# Patient Record
Sex: FEMALE | Race: WHITE | Employment: OTHER | URBAN - METROPOLITAN AREA
[De-identification: names, ages, dates, MRNs, and addresses within clinical notes are randomized per-mention and may not be internally consistent; named-entity substitution may affect disease eponyms.]

---

## 2018-08-16 ENCOUNTER — HOSPITAL ENCOUNTER (EMERGENCY)
Age: 84
Discharge: HOME | End: 2018-08-16
Attending: EMERGENCY MEDICINE
Payer: MEDICARE

## 2018-08-16 VITALS
OXYGEN SATURATION: 96 % | HEIGHT: 59 IN | TEMPERATURE: 97.8 F | RESPIRATION RATE: 18 BRPM | WEIGHT: 150 LBS | HEART RATE: 73 BPM | SYSTOLIC BLOOD PRESSURE: 134 MMHG | DIASTOLIC BLOOD PRESSURE: 79 MMHG | BODY MASS INDEX: 30.24 KG/M2

## 2018-08-16 DIAGNOSIS — T63.441A ALLERGIC REACTION TO BEE STING: Primary | ICD-10-CM

## 2018-08-16 PROCEDURE — 99282 EMERGENCY DEPT VISIT SF MDM: CPT

## 2018-08-16 ASSESSMENT — ENCOUNTER SYMPTOMS
SHORTNESS OF BREATH: 1
COUGH: 0
WHEEZING: 0
CONSTIPATION: 0
SORE THROAT: 0
VOMITING: 0
SINUS PAIN: 0
NAUSEA: 0
TROUBLE SWALLOWING: 1
PHOTOPHOBIA: 0
SINUS PRESSURE: 0
RHINORRHEA: 0
ABDOMINAL PAIN: 0
DIARRHEA: 0

## 2018-08-16 ASSESSMENT — PAIN DESCRIPTION - PAIN TYPE: TYPE: ACUTE PAIN

## 2018-08-16 ASSESSMENT — PAIN SCALES - GENERAL: PAINLEVEL_OUTOF10: 5

## 2018-08-16 ASSESSMENT — PAIN DESCRIPTION - LOCATION: LOCATION: HAND

## 2018-08-16 ASSESSMENT — PAIN DESCRIPTION - ORIENTATION: ORIENTATION: LEFT

## 2018-08-16 NOTE — ED PROVIDER NOTES
confusion. Physical Exam   Constitutional: She is oriented to person, place, and time. She appears well-developed and well-nourished. No distress. HENT:   Head: Normocephalic and atraumatic. Mouth/Throat: No oropharyngeal exudate. Upper lip swelling noted. No posterior oropharyngeal erythema or edema. Eyes: Pupils are equal, round, and reactive to light. Conjunctivae are normal. Right eye exhibits no discharge. Left eye exhibits no discharge. No scleral icterus. Neck: Normal range of motion. Neck supple. Cardiovascular: Normal rate, regular rhythm and normal heart sounds. Exam reveals no gallop. No murmur heard. Pulmonary/Chest: Effort normal and breath sounds normal. No respiratory distress. She has no decreased breath sounds. She has no wheezes. Clear breath sounds in all lung fields. No wheezes, or increased work of breathing. Pulse ox 97% on room air. No stridor present   Abdominal: Soft. Bowel sounds are normal. She exhibits no distension. There is no tenderness. Musculoskeletal: Normal range of motion. She exhibits no edema or tenderness. 3 areas of erythema on the left dorsal aspect of the hand. No stinger present. Lymphadenopathy:     She has no cervical adenopathy. Neurological: She is alert and oriented to person, place, and time. No cranial nerve deficit. Coordination normal.   Skin: Skin is warm and dry. No rash noted. She is not diaphoretic. No erythema. No urticaria present. Psychiatric: She has a normal mood and affect. Her behavior is normal.       Procedures    [facility]         --------------------------------------------- PAST HISTORY ---------------------------------------------  Past Medical History:  has a past medical history of Hypertension. Past Surgical History:  has a past surgical history that includes Hysterectomy; Eye surgery; Nerve Block (Bilateral); and Nerve Block (Bilateral, 08/30/2016). Social History:  reports that she has never smoked.  She has never used smokeless tobacco. She reports that she does not drink alcohol or use drugs. Family History: family history is not on file. The patients home medications have been reviewed. Allergies: Patient has no known allergies. -------------------------------------------------- RESULTS -------------------------------------------------  Labs:  No results found for this visit on 08/16/18. Radiology:  No orders to display       ------------------------- NURSING NOTES AND VITALS REVIEWED ---------------------------  Date / Time Roomed:  8/16/2018  4:05 PM  ED Bed Assignment:  07/07    The nursing notes within the ED encounter and vital signs as below have been reviewed. /73   Pulse 66   Temp 98 °F (36.7 °C) (Oral)   Resp 18   Ht 4' 11\" (1.499 m)   Wt 150 lb (68 kg)   SpO2 96%   BMI 30.30 kg/m²   Oxygen Saturation Interpretation: Normal      ------------------------------------------ PROGRESS NOTES ------------------------------------------  Time: 17:47. Patient sleeping, no acute distress. Time: 19:01. Patient sleeping, woke her up. She is in no acute distress. She feels better. The patient will be discharged home. I have spoken with the patient and discussed todays results, in addition to providing specific details for the plan of care and counseling regarding the diagnosis and prognosis. Their questions are answered at this time and they are agreeable with the plan. I discussed at length with them reasons for immediate return here for re evaluation. They will followup with primary care by calling their office tomorrow. --------------------------------- ADDITIONAL PROVIDER NOTES ---------------------------------  At this time the patient is without objective evidence of an acute process requiring hospitalization or inpatient management. They have remained hemodynamically stable throughout their entire ED visit and are stable for discharge with outpatient follow-up. The plan has been discussed in detail and they are aware of the specific conditions for emergent return, as well as the importance of follow-up. New Prescriptions    No medications on file       Diagnosis:  1. Allergic reaction to bee sting        Disposition:  Patient's disposition: Discharge to home  Patient's condition is stable. ATTENDING PROVIDER ATTESTATION:     I have personally performed and/or participated in the history, exam, medical decision making, and procedures and agree with all pertinent clinical information unless otherwise noted. I have also reviewed and agree with the past medical, family and social history unless otherwise noted. I have discussed this patient in detail with the resident and provided the instruction and education regarding the evidence-based evaluation and treatment of Allergic Reaction (Patient was stung around 15:15. Patient brought in per EMS for allergic reaction which has never happened before acording to th patient. 0.5 of EPI was given, 50mg of Benadryl, 125mg Solumedrol and Duoneb treatment given prior to arrival.)    History: Patient is a 71-year-old female presenting to the emergency department the chief complaint of allergic reaction. The patient states that presently an hour prior to arrival she sustained 3 bee stings in the left hand. The patient felt lightheaded. The patient did also feel she is having difficulty breathing and that her throat was closing up and was difficult to swallow. EMS was called when they arrived the patient's pulse ox was 79% on room air. The patient does not wear oxygen. Patient did receive 1 DuoNeb, 0.2 mg of epinephrine, 50 mg of Benadryl and 125 mg of Solu-Medrol. The patient states she did have significant improvement in her symptoms. The patient denies any symptoms at the current time.  The patient never had a similar reaction in the past.    My findings: Rios Jones is a 81 y.o. female   Constitutional/General:

## 2018-08-16 NOTE — ED NOTES
Bed: 07  Expected date:   Expected time:   Means of arrival:   Comments:  Jody Thomas RN  08/16/18 8212

## 2024-11-12 ENCOUNTER — HOSPITAL ENCOUNTER (OUTPATIENT)
Age: 89
Discharge: HOME | End: 2024-11-12
Payer: MEDICARE

## 2024-11-12 LAB
ALBUMIN SERPL-MCNC: 3.9 G/DL (ref 3.5–5.2)
ALP SERPL-CCNC: 48 U/L (ref 35–104)
ALT SERPL-CCNC: 12 U/L (ref 0–32)
ANION GAP SERPL CALCULATED.3IONS-SCNC: 9 MMOL/L (ref 7–16)
AST SERPL-CCNC: 17 U/L (ref 0–31)
BASOPHILS # BLD: 0.07 K/UL (ref 0–0.2)
BASOPHILS NFR BLD: 1 % (ref 0–2)
BILIRUB SERPL-MCNC: 0.4 MG/DL (ref 0–1.2)
BUN SERPL-MCNC: 15 MG/DL (ref 6–23)
CALCIUM SERPL-MCNC: 11.1 MG/DL (ref 8.6–10.2)
CHLORIDE SERPL-SCNC: 96 MMOL/L (ref 98–107)
CO2 SERPL-SCNC: 29 MMOL/L (ref 22–29)
CREAT SERPL-MCNC: 0.7 MG/DL (ref 0.5–1)
EOSINOPHIL # BLD: 0.57 K/UL (ref 0.05–0.5)
EOSINOPHILS RELATIVE PERCENT: 8 % (ref 0–6)
ERYTHROCYTE [DISTWIDTH] IN BLOOD BY AUTOMATED COUNT: 12.6 % (ref 11.5–15)
GFR, ESTIMATED: 76 ML/MIN/1.73M2
GLUCOSE SERPL-MCNC: 115 MG/DL (ref 74–99)
HBA1C MFR BLD: 5.5 % (ref 4–5.6)
HCT VFR BLD AUTO: 38.4 % (ref 34–48)
HGB BLD-MCNC: 13.3 G/DL (ref 11.5–15.5)
IMM GRANULOCYTES # BLD AUTO: 0.03 K/UL (ref 0–0.58)
IMM GRANULOCYTES NFR BLD: 0 % (ref 0–5)
LYMPHOCYTES NFR BLD: 2.34 K/UL (ref 1.5–4)
LYMPHOCYTES RELATIVE PERCENT: 31 % (ref 20–42)
MCH RBC QN AUTO: 32.8 PG (ref 26–35)
MCHC RBC AUTO-ENTMCNC: 34.6 G/DL (ref 32–34.5)
MCV RBC AUTO: 94.8 FL (ref 80–99.9)
MONOCYTES NFR BLD: 0.89 K/UL (ref 0.1–0.95)
MONOCYTES NFR BLD: 12 % (ref 2–12)
NEUTROPHILS NFR BLD: 48 % (ref 43–80)
NEUTS SEG NFR BLD: 3.6 K/UL (ref 1.8–7.3)
PLATELET # BLD AUTO: 281 K/UL (ref 130–450)
PMV BLD AUTO: 8.8 FL (ref 7–12)
POTASSIUM SERPL-SCNC: 4.3 MMOL/L (ref 3.5–5)
PROT SERPL-MCNC: 6.5 G/DL (ref 6.4–8.3)
RBC # BLD AUTO: 4.05 M/UL (ref 3.5–5.5)
SODIUM SERPL-SCNC: 134 MMOL/L (ref 132–146)
TSH SERPL DL<=0.05 MIU/L-ACNC: 2.21 UIU/ML (ref 0.27–4.2)
WBC OTHER # BLD: 7.5 K/UL (ref 4.5–11.5)

## 2024-11-12 PROCEDURE — 84443 ASSAY THYROID STIM HORMONE: CPT

## 2024-11-12 PROCEDURE — 85025 COMPLETE CBC W/AUTO DIFF WBC: CPT

## 2024-11-12 PROCEDURE — 80053 COMPREHEN METABOLIC PANEL: CPT

## 2024-11-12 PROCEDURE — 36415 COLL VENOUS BLD VENIPUNCTURE: CPT

## 2024-11-12 PROCEDURE — 83036 HEMOGLOBIN GLYCOSYLATED A1C: CPT

## 2025-07-13 ENCOUNTER — HOSPITAL ENCOUNTER (INPATIENT)
Facility: HOSPITAL | Age: 89
End: 2025-07-13
Attending: INTERNAL MEDICINE | Admitting: INTERNAL MEDICINE
Payer: MEDICARE

## 2025-07-13 ENCOUNTER — APPOINTMENT (OUTPATIENT)
Dept: CARDIOLOGY | Facility: HOSPITAL | Age: 89
DRG: 871 | End: 2025-07-13
Payer: MEDICARE

## 2025-07-13 ENCOUNTER — APPOINTMENT (OUTPATIENT)
Dept: RADIOLOGY | Facility: HOSPITAL | Age: 89
DRG: 871 | End: 2025-07-13
Payer: MEDICARE

## 2025-07-13 ENCOUNTER — HOSPITAL ENCOUNTER (INPATIENT)
Facility: HOSPITAL | Age: 89
LOS: 4 days | Discharge: HOME | DRG: 871 | End: 2025-07-18
Attending: EMERGENCY MEDICINE | Admitting: INTERNAL MEDICINE
Payer: MEDICARE

## 2025-07-13 DIAGNOSIS — I10 BENIGN ESSENTIAL HTN: ICD-10-CM

## 2025-07-13 DIAGNOSIS — N20.1 URETERAL STONE: ICD-10-CM

## 2025-07-13 DIAGNOSIS — N39.0 SEPSIS SECONDARY TO UTI (MULTI): Primary | ICD-10-CM

## 2025-07-13 DIAGNOSIS — A41.9 SEPSIS SECONDARY TO UTI (MULTI): Primary | ICD-10-CM

## 2025-07-13 DIAGNOSIS — R79.89 ELEVATED TROPONIN: ICD-10-CM

## 2025-07-13 DIAGNOSIS — I21.4 NSTEMI (NON-ST ELEVATED MYOCARDIAL INFARCTION) (MULTI): ICD-10-CM

## 2025-07-13 DIAGNOSIS — N28.89: ICD-10-CM

## 2025-07-13 LAB
ALBUMIN SERPL BCP-MCNC: 3.5 G/DL (ref 3.4–5)
ALP SERPL-CCNC: 65 U/L (ref 33–136)
ALT SERPL W P-5'-P-CCNC: 11 U/L (ref 7–45)
ANION GAP BLDV CALCULATED.4IONS-SCNC: 7 MMOL/L (ref 10–25)
ANION GAP SERPL CALC-SCNC: 15 MMOL/L (ref 10–20)
APPEARANCE UR: CLEAR
AST SERPL W P-5'-P-CCNC: 15 U/L (ref 9–39)
BASE EXCESS BLDV CALC-SCNC: 3.1 MMOL/L (ref -2–3)
BASOPHILS # BLD AUTO: 0.04 X10*3/UL (ref 0–0.1)
BASOPHILS NFR BLD AUTO: 0.2 %
BILIRUB DIRECT SERPL-MCNC: 0.3 MG/DL (ref 0–0.3)
BILIRUB SERPL-MCNC: 1.1 MG/DL (ref 0–1.2)
BILIRUB UR STRIP.AUTO-MCNC: NEGATIVE MG/DL
BNP SERPL-MCNC: 326 PG/ML (ref 0–99)
BODY TEMPERATURE: 37 DEGREES CELSIUS
BUN SERPL-MCNC: 18 MG/DL (ref 6–23)
CA-I BLDV-SCNC: 1.37 MMOL/L (ref 1.1–1.33)
CALCIUM SERPL-MCNC: 10.9 MG/DL (ref 8.6–10.3)
CARDIAC TROPONIN I PNL SERPL HS: 45 NG/L (ref 0–13)
CARDIAC TROPONIN I PNL SERPL HS: 587 NG/L (ref 0–13)
CARDIAC TROPONIN I PNL SERPL HS: 636 NG/L (ref 0–13)
CHLORIDE BLDV-SCNC: 96 MMOL/L (ref 98–107)
CHLORIDE SERPL-SCNC: 92 MMOL/L (ref 98–107)
CO2 SERPL-SCNC: 26 MMOL/L (ref 21–32)
COLOR UR: YELLOW
CREAT SERPL-MCNC: 1.01 MG/DL (ref 0.5–1.05)
EGFRCR SERPLBLD CKD-EPI 2021: 48 ML/MIN/1.73M*2
EOSINOPHIL # BLD AUTO: 0.02 X10*3/UL (ref 0–0.4)
EOSINOPHIL NFR BLD AUTO: 0.1 %
ERYTHROCYTE [DISTWIDTH] IN BLOOD BY AUTOMATED COUNT: 12.2 % (ref 11.5–14.5)
GLUCOSE BLDV-MCNC: 144 MG/DL (ref 74–99)
GLUCOSE SERPL-MCNC: 169 MG/DL (ref 74–99)
GLUCOSE UR STRIP.AUTO-MCNC: NORMAL MG/DL
HCO3 BLDV-SCNC: 27 MMOL/L (ref 22–26)
HCT VFR BLD AUTO: 35.5 % (ref 36–46)
HCT VFR BLD EST: 36 % (ref 36–46)
HGB BLD-MCNC: 12.6 G/DL (ref 12–16)
HGB BLDV-MCNC: 12.1 G/DL (ref 12–16)
HYALINE CASTS #/AREA URNS AUTO: ABNORMAL /LPF
IMM GRANULOCYTES # BLD AUTO: 0.22 X10*3/UL (ref 0–0.5)
IMM GRANULOCYTES NFR BLD AUTO: 1.3 % (ref 0–0.9)
INHALED O2 CONCENTRATION: 21 %
KETONES UR STRIP.AUTO-MCNC: ABNORMAL MG/DL
LACTATE BLDV-SCNC: 1.2 MMOL/L (ref 0.4–2)
LACTATE SERPL-SCNC: 1 MMOL/L (ref 0.4–2)
LACTATE SERPL-SCNC: 2.9 MMOL/L (ref 0.4–2)
LEUKOCYTE ESTERASE UR QL STRIP.AUTO: ABNORMAL
LYMPHOCYTES # BLD AUTO: 0.5 X10*3/UL (ref 0.8–3)
LYMPHOCYTES NFR BLD AUTO: 3 %
MAGNESIUM SERPL-MCNC: 1.28 MG/DL (ref 1.6–2.4)
MCH RBC QN AUTO: 32.4 PG (ref 26–34)
MCHC RBC AUTO-ENTMCNC: 35.5 G/DL (ref 32–36)
MCV RBC AUTO: 91 FL (ref 80–100)
MONOCYTES # BLD AUTO: 0.18 X10*3/UL (ref 0.05–0.8)
MONOCYTES NFR BLD AUTO: 1.1 %
MUCOUS THREADS #/AREA URNS AUTO: ABNORMAL /LPF
NEUTROPHILS # BLD AUTO: 15.95 X10*3/UL (ref 1.6–5.5)
NEUTROPHILS NFR BLD AUTO: 94.3 %
NITRITE UR QL STRIP.AUTO: NEGATIVE
NRBC BLD-RTO: 0 /100 WBCS (ref 0–0)
OXYHGB MFR BLDV: 88 % (ref 45–75)
PCO2 BLDV: 38 MM HG (ref 41–51)
PH BLDV: 7.46 PH (ref 7.33–7.43)
PH UR STRIP.AUTO: 6 [PH]
PLATELET # BLD AUTO: 398 X10*3/UL (ref 150–450)
PO2 BLDV: 55 MM HG (ref 35–45)
POTASSIUM BLDV-SCNC: 2.6 MMOL/L (ref 3.5–5.3)
POTASSIUM SERPL-SCNC: 3.4 MMOL/L (ref 3.5–5.3)
PROT SERPL-MCNC: 6.9 G/DL (ref 6.4–8.2)
PROT UR STRIP.AUTO-MCNC: ABNORMAL MG/DL
RBC # BLD AUTO: 3.89 X10*6/UL (ref 4–5.2)
RBC # UR STRIP.AUTO: ABNORMAL MG/DL
RBC #/AREA URNS AUTO: ABNORMAL /HPF
SAO2 % BLDV: 91 % (ref 45–75)
SODIUM BLDV-SCNC: 127 MMOL/L (ref 136–145)
SODIUM SERPL-SCNC: 130 MMOL/L (ref 136–145)
SP GR UR STRIP.AUTO: 1.01
SQUAMOUS #/AREA URNS AUTO: ABNORMAL /HPF
UROBILINOGEN UR STRIP.AUTO-MCNC: ABNORMAL MG/DL
WBC # BLD AUTO: 16.9 X10*3/UL (ref 4.4–11.3)
WBC #/AREA URNS AUTO: ABNORMAL /HPF
WBC CLUMPS #/AREA URNS AUTO: ABNORMAL /HPF

## 2025-07-13 PROCEDURE — 87086 URINE CULTURE/COLONY COUNT: CPT | Mod: PORLAB | Performed by: EMERGENCY MEDICINE

## 2025-07-13 PROCEDURE — P9612 CATHETERIZE FOR URINE SPEC: HCPCS

## 2025-07-13 PROCEDURE — 36415 COLL VENOUS BLD VENIPUNCTURE: CPT | Performed by: EMERGENCY MEDICINE

## 2025-07-13 PROCEDURE — 2550000001 HC RX 255 CONTRASTS: Performed by: EMERGENCY MEDICINE

## 2025-07-13 PROCEDURE — 83605 ASSAY OF LACTIC ACID: CPT | Performed by: EMERGENCY MEDICINE

## 2025-07-13 PROCEDURE — 93005 ELECTROCARDIOGRAM TRACING: CPT

## 2025-07-13 PROCEDURE — 96365 THER/PROPH/DIAG IV INF INIT: CPT

## 2025-07-13 PROCEDURE — 87077 CULTURE AEROBIC IDENTIFY: CPT | Mod: PORLAB | Performed by: EMERGENCY MEDICINE

## 2025-07-13 PROCEDURE — 96366 THER/PROPH/DIAG IV INF ADDON: CPT

## 2025-07-13 PROCEDURE — 96367 TX/PROPH/DG ADDL SEQ IV INF: CPT

## 2025-07-13 PROCEDURE — 83735 ASSAY OF MAGNESIUM: CPT | Performed by: EMERGENCY MEDICINE

## 2025-07-13 PROCEDURE — 71045 X-RAY EXAM CHEST 1 VIEW: CPT | Performed by: STUDENT IN AN ORGANIZED HEALTH CARE EDUCATION/TRAINING PROGRAM

## 2025-07-13 PROCEDURE — 96375 TX/PRO/DX INJ NEW DRUG ADDON: CPT

## 2025-07-13 PROCEDURE — 81001 URINALYSIS AUTO W/SCOPE: CPT | Performed by: EMERGENCY MEDICINE

## 2025-07-13 PROCEDURE — 84484 ASSAY OF TROPONIN QUANT: CPT | Performed by: EMERGENCY MEDICINE

## 2025-07-13 PROCEDURE — 2500000004 HC RX 250 GENERAL PHARMACY W/ HCPCS (ALT 636 FOR OP/ED): Performed by: EMERGENCY MEDICINE

## 2025-07-13 PROCEDURE — 82248 BILIRUBIN DIRECT: CPT | Performed by: EMERGENCY MEDICINE

## 2025-07-13 PROCEDURE — 2500000004 HC RX 250 GENERAL PHARMACY W/ HCPCS (ALT 636 FOR OP/ED): Performed by: STUDENT IN AN ORGANIZED HEALTH CARE EDUCATION/TRAINING PROGRAM

## 2025-07-13 PROCEDURE — 84132 ASSAY OF SERUM POTASSIUM: CPT | Performed by: EMERGENCY MEDICINE

## 2025-07-13 PROCEDURE — 83880 ASSAY OF NATRIURETIC PEPTIDE: CPT | Performed by: EMERGENCY MEDICINE

## 2025-07-13 PROCEDURE — 74177 CT ABD & PELVIS W/CONTRAST: CPT | Performed by: STUDENT IN AN ORGANIZED HEALTH CARE EDUCATION/TRAINING PROGRAM

## 2025-07-13 PROCEDURE — 96361 HYDRATE IV INFUSION ADD-ON: CPT

## 2025-07-13 PROCEDURE — 85025 COMPLETE CBC W/AUTO DIFF WBC: CPT | Performed by: EMERGENCY MEDICINE

## 2025-07-13 PROCEDURE — 71045 X-RAY EXAM CHEST 1 VIEW: CPT

## 2025-07-13 PROCEDURE — 74177 CT ABD & PELVIS W/CONTRAST: CPT

## 2025-07-13 PROCEDURE — 80048 BASIC METABOLIC PNL TOTAL CA: CPT | Performed by: EMERGENCY MEDICINE

## 2025-07-13 PROCEDURE — 87154 CUL TYP ID BLD PTHGN 6+ TRGT: CPT | Mod: PORLAB | Performed by: EMERGENCY MEDICINE

## 2025-07-13 PROCEDURE — 99291 CRITICAL CARE FIRST HOUR: CPT | Mod: 25 | Performed by: EMERGENCY MEDICINE

## 2025-07-13 RX ORDER — BENAZEPRIL HYDROCHLORIDE AND HYDROCHLOROTHIAZIDE 20; 12.5 MG/1; MG/1
1 TABLET ORAL DAILY
COMMUNITY
End: 2025-07-18 | Stop reason: HOSPADM

## 2025-07-13 RX ORDER — PROPRANOLOL HYDROCHLORIDE 80 MG/1
80 TABLET ORAL 2 TIMES DAILY
COMMUNITY

## 2025-07-13 RX ORDER — MAGNESIUM SULFATE HEPTAHYDRATE 40 MG/ML
2 INJECTION, SOLUTION INTRAVENOUS ONCE
Status: COMPLETED | OUTPATIENT
Start: 2025-07-13 | End: 2025-07-13

## 2025-07-13 RX ORDER — ONDANSETRON HYDROCHLORIDE 2 MG/ML
4 INJECTION, SOLUTION INTRAVENOUS ONCE
Status: COMPLETED | OUTPATIENT
Start: 2025-07-13 | End: 2025-07-13

## 2025-07-13 RX ORDER — VERAPAMIL HCL 240 MG
240 TABLET, EXTENDED RELEASE ORAL DAILY
COMMUNITY

## 2025-07-13 RX ORDER — NITROFURANTOIN (MACROCRYSTALS) 100 MG/1
100 CAPSULE ORAL 2 TIMES DAILY
COMMUNITY
Start: 2025-07-08 | End: 2025-07-18 | Stop reason: HOSPADM

## 2025-07-13 RX ADMIN — PIPERACILLIN SODIUM AND TAZOBACTAM SODIUM 4.5 G: 4; .5 INJECTION, SOLUTION INTRAVENOUS at 15:35

## 2025-07-13 RX ADMIN — SODIUM CHLORIDE 1000 ML: 0.9 INJECTION, SOLUTION INTRAVENOUS at 15:07

## 2025-07-13 RX ADMIN — IOHEXOL 75 ML: 350 INJECTION, SOLUTION INTRAVENOUS at 16:27

## 2025-07-13 RX ADMIN — ONDANSETRON 4 MG: 2 INJECTION, SOLUTION INTRAMUSCULAR; INTRAVENOUS at 15:07

## 2025-07-13 RX ADMIN — MAGNESIUM SULFATE HEPTAHYDRATE 2 G: 40 INJECTION, SOLUTION INTRAVENOUS at 17:31

## 2025-07-13 RX ADMIN — PIPERACILLIN SODIUM AND TAZOBACTAM SODIUM 2.25 G: 2; .25 INJECTION, SOLUTION INTRAVENOUS at 23:44

## 2025-07-13 ASSESSMENT — PAIN SCALES - GENERAL: PAINLEVEL_OUTOF10: 6

## 2025-07-13 ASSESSMENT — PAIN DESCRIPTION - PAIN TYPE: TYPE: CHRONIC PAIN

## 2025-07-13 ASSESSMENT — PAIN SCALES - WONG BAKER: WONGBAKER_NUMERICALRESPONSE: HURTS LITTLE MORE

## 2025-07-13 ASSESSMENT — PAIN DESCRIPTION - LOCATION: LOCATION: BACK

## 2025-07-13 ASSESSMENT — PAIN - FUNCTIONAL ASSESSMENT: PAIN_FUNCTIONAL_ASSESSMENT: WONG-BAKER FACES

## 2025-07-13 NOTE — ED PROCEDURE NOTE
Procedure  Critical Care    Performed by: Emiliano Pinto MD  Authorized by: Emiliano Pinto MD    Critical care provider statement:     Critical care time (minutes):  35    Critical care time was exclusive of:  Separately billable procedures and treating other patients    Critical care was necessary to treat or prevent imminent or life-threatening deterioration of the following conditions:  Sepsis    Critical care was time spent personally by me on the following activities:  Blood draw for specimens, development of treatment plan with patient or surrogate, discussions with primary provider, evaluation of patient's response to treatment, review of old charts, re-evaluation of patient's condition, pulse oximetry, ordering and review of laboratory studies, ordering and review of radiographic studies, ordering and performing treatments and interventions and obtaining history from patient or surrogate    Care discussed with: admitting provider                 Emiliano Pinto MD  07/13/25 2993

## 2025-07-13 NOTE — ED PROVIDER NOTES
Emergency Department Provider Note       HPI: []  89-year-old white female history of hypertension comes in with nausea vomiting.  Patient was seen by her primary doctor about a week ago was told she had a UTI placed antibiotics over the last few days she been vomiting unable to keep any food or fluids down.  No trauma falls fever chills cough congestion incontinences seizures syncope or Nisky with no hematemesis melena hematochezia hemoptysis no change in bowel status no trauma no falls.    Past history: Hypertension  Social: No history of reported tobacco alcohol drug abuse.  REVIEW OF SYSTEMS:    GENERAL.: No weight loss, fatigue, anorexia, insomnia, fever.    EYES: No vision loss, double vision, drainage, eye pain.    ENT: No pharyngitis, dry mouth.    CARDIOPULMONARY: No chest pain, palpitations, syncope, near syncope. No shortness of breath, cough, hemoptysis.    GI: No abdominal pain, change in bowel habits, melena, hematemesis, hematochezia, nausea, positive for vomiting, diarrhea.    : No discharge, dysuria, frequency, urgency, hematuria.    MS: No limb pain, joint pain, joint swelling.    SKIN: No rashes.    PSYCH: No depression, anxiety, suicidality, homicidality.    Review of systems is otherwise negative unless stated above or in history of present illness.  Social history, family history, allergies reviewed.  PHYSICAL EXAM:    GENERAL: Vitals noted, no distress. Alert and oriented  x 1 appears uncomfortable high BMI non-toxic.      EENT: TMs clear. Posterior oropharynx unremarkable. No meningismus. No LAD.     NECK: Supple. Nontender. No midline tenderness.     CARDIAC: Regular, rate, rhythm. No murmurs rubs or gallops. No JVD    PULMONARY: Coarse bibasilar crackles no tachypnea stridor or retractions able to speak in full sentences no wheezes rales or rhonchi. No respiratory distress.     ABDOMEN: Soft, nonsurgical.  Diffuse tenderness no rebound or guarding negative Martinez sign and negative  McBurney point tenderness no CVA tenderness.  No peritoneal signs. Normoactive bowel sounds. No pulsatile masses.     EXTREMITIES: No peripheral edema. Negative Homans bilaterally, no cords.  2+ bounding pulses well-perfused.    SKIN: No rash. Intact.     NEURO: No focal neurologic deficits, NIH score of 0. Cranial nerves normal as tested from II through XII.     MEDICAL DECISION MAKING:    EKG on my interpretation shows a normal sinus rhythm normal axis rate in the mid 80s with PACs with no acute ischemic changes.    CBC shows leukocytosis of 17,000, chemistries are pending troponin 45 elevated,  elevated, chest ray shows a pulmonary vessel congestion UA shows a UTI.    Treatment in the ED: On arrival established pancultured given IV fluids and not shock IV Zosyn and IV Zofran.    ED course: Patient remained stable hemodynamic.    Impression: #1 sepsis due to UTI, #2 nausea vomiting    Plan/MDM: 89-year-old female comes in with what appears to be sepsis due to urine tract infection currently awaiting abdominal Results to Lower the Possibility of Bowel Obstruction Bowel Ischemia or Pyelonephritis or Obstructive Uropathy, Anticipate Patient Will Be Hospitalized for Further Care.  Low Suspicion for Septic Shock STEMI or NSTEMI, or Stroke or TIA,                                                     Emiliano Pinto MD  07/13/25 1601       Emiliano Pinto MD  07/13/25 7740

## 2025-07-13 NOTE — PROCEDURES
"IR consulted regarding possible perc neph on 89 y/o female with right sided UVJ stone. Imaging findings report possible small ureteral perforation. Patient has remained stable and afebrile since presenting to ER. Urology has recommended PCN. WBC 16.    Imagine shows prominent right renal pelvis but only mild calycle dilatation. Given that, retrograde stent placement should also be considered due to potential difficulty of PCN as well as potentiality patient may not necessarily tolerate prone positioning for PCN.     If PCN is ultimately elected, pcn will be tentatively planned for tomorrow given patient is completely stable and afebrile as well as limited local and IR resources over the weekend. If condition changes please reach out to IR for additional discussion.     Melisa Ortiz MD    IR        7/13/2025     2:07 PM 7/13/2025     3:36 PM 7/13/2025     6:20 PM   Vitals   Systolic 181 153 132   Diastolic 118 74 74   BP Location Right arm  Right arm   Heart Rate 98 94 86   Temp 37.2 °C (99 °F)     Resp 18 16 17   Height 1.549 m (5' 1\")     Weight (lb) 158.95     BMI 30.03 kg/m2     BSA (m2) 1.76 m2           "

## 2025-07-14 ENCOUNTER — APPOINTMENT (OUTPATIENT)
Dept: CARDIOLOGY | Facility: HOSPITAL | Age: 89
DRG: 871 | End: 2025-07-14
Payer: MEDICARE

## 2025-07-14 PROBLEM — N39.0 SEPSIS SECONDARY TO UTI (MULTI): Status: ACTIVE | Noted: 2025-07-14

## 2025-07-14 PROBLEM — A41.9 SEPSIS SECONDARY TO UTI (MULTI): Status: ACTIVE | Noted: 2025-07-14

## 2025-07-14 LAB
APPEARANCE UR: ABNORMAL
ATRIAL RATE: 99 BPM
BACTERIA #/AREA URNS AUTO: ABNORMAL /HPF
BACTERIA UR CULT: NORMAL
BILIRUB UR STRIP.AUTO-MCNC: NEGATIVE MG/DL
COLOR UR: ABNORMAL
GLUCOSE UR STRIP.AUTO-MCNC: NORMAL MG/DL
HOLD SPECIMEN: NORMAL
KETONES UR STRIP.AUTO-MCNC: NEGATIVE MG/DL
LEUKOCYTE ESTERASE UR QL STRIP.AUTO: ABNORMAL
NITRITE UR QL STRIP.AUTO: NEGATIVE
P AXIS: 73 DEGREES
P MIRABILIS DNA BLD POS QL NAA+PROBE: DETECTED
PH UR STRIP.AUTO: 6.5 [PH]
PR INTERVAL: 207 MS
PROT UR STRIP.AUTO-MCNC: ABNORMAL MG/DL
Q ONSET: 253 MS
QRS COUNT: 15 BEATS
QRS DURATION: 88 MS
QT INTERVAL: 346 MS
QTC CALCULATION(BAZETT): 440 MS
QTC FREDERICIA: 405 MS
R AXIS: -23 DEGREES
RBC # UR STRIP.AUTO: ABNORMAL MG/DL
RBC #/AREA URNS AUTO: >20 /HPF
SP GR UR STRIP.AUTO: >1.05
T AXIS: 116 DEGREES
T OFFSET: 426 MS
UROBILINOGEN UR STRIP.AUTO-MCNC: NORMAL MG/DL
VENTRICULAR RATE: 97 BPM
WBC #/AREA URNS AUTO: >50 /HPF
WBC CLUMPS #/AREA URNS AUTO: ABNORMAL /HPF

## 2025-07-14 PROCEDURE — 96375 TX/PRO/DX INJ NEW DRUG ADDON: CPT | Mod: 59

## 2025-07-14 PROCEDURE — 2720000007 HC OR 272 NO HCPCS

## 2025-07-14 PROCEDURE — 0T933ZZ DRAINAGE OF RIGHT KIDNEY PELVIS, PERCUTANEOUS APPROACH: ICD-10-PCS | Performed by: RADIOLOGY

## 2025-07-14 PROCEDURE — 96376 TX/PRO/DX INJ SAME DRUG ADON: CPT | Mod: 59

## 2025-07-14 PROCEDURE — 81001 URINALYSIS AUTO W/SCOPE: CPT | Performed by: EMERGENCY MEDICINE

## 2025-07-14 PROCEDURE — 2060000001 HC INTERMEDIATE ICU ROOM DAILY

## 2025-07-14 PROCEDURE — 2500000004 HC RX 250 GENERAL PHARMACY W/ HCPCS (ALT 636 FOR OP/ED): Performed by: EMERGENCY MEDICINE

## 2025-07-14 PROCEDURE — 96366 THER/PROPH/DIAG IV INF ADDON: CPT | Mod: 59

## 2025-07-14 PROCEDURE — 50432 PLMT NEPHROSTOMY CATHETER: CPT | Performed by: RADIOLOGY

## 2025-07-14 PROCEDURE — 99222 1ST HOSP IP/OBS MODERATE 55: CPT | Performed by: INTERNAL MEDICINE

## 2025-07-14 PROCEDURE — 2500000004 HC RX 250 GENERAL PHARMACY W/ HCPCS (ALT 636 FOR OP/ED): Performed by: RADIOLOGY

## 2025-07-14 PROCEDURE — C1894 INTRO/SHEATH, NON-LASER: HCPCS

## 2025-07-14 PROCEDURE — 2550000001 HC RX 255 CONTRASTS: Performed by: RADIOLOGY

## 2025-07-14 PROCEDURE — C1729 CATH, DRAINAGE: HCPCS

## 2025-07-14 PROCEDURE — 2500000004 HC RX 250 GENERAL PHARMACY W/ HCPCS (ALT 636 FOR OP/ED): Performed by: NURSE PRACTITIONER

## 2025-07-14 PROCEDURE — 87086 URINE CULTURE/COLONY COUNT: CPT | Mod: PORLAB | Performed by: EMERGENCY MEDICINE

## 2025-07-14 PROCEDURE — 99223 1ST HOSP IP/OBS HIGH 75: CPT | Performed by: NURSE PRACTITIONER

## 2025-07-14 RX ORDER — MORPHINE SULFATE 2 MG/ML
2 INJECTION, SOLUTION INTRAMUSCULAR; INTRAVENOUS EVERY 4 HOURS PRN
Status: DISCONTINUED | OUTPATIENT
Start: 2025-07-14 | End: 2025-07-18 | Stop reason: HOSPADM

## 2025-07-14 RX ORDER — KETOROLAC TROMETHAMINE 30 MG/ML
15 INJECTION, SOLUTION INTRAMUSCULAR; INTRAVENOUS EVERY 8 HOURS PRN
Status: DISCONTINUED | OUTPATIENT
Start: 2025-07-14 | End: 2025-07-14

## 2025-07-14 RX ORDER — ONDANSETRON HYDROCHLORIDE 2 MG/ML
4 INJECTION, SOLUTION INTRAVENOUS EVERY 6 HOURS PRN
Status: DISCONTINUED | OUTPATIENT
Start: 2025-07-14 | End: 2025-07-18 | Stop reason: HOSPADM

## 2025-07-14 RX ORDER — DEXTROSE MONOHYDRATE AND SODIUM CHLORIDE 5; .45 G/100ML; G/100ML
75 INJECTION, SOLUTION INTRAVENOUS CONTINUOUS
Status: DISCONTINUED | OUTPATIENT
Start: 2025-07-14 | End: 2025-07-15

## 2025-07-14 RX ORDER — MORPHINE SULFATE 2 MG/ML
1 INJECTION, SOLUTION INTRAMUSCULAR; INTRAVENOUS EVERY 4 HOURS PRN
Status: DISCONTINUED | OUTPATIENT
Start: 2025-07-14 | End: 2025-07-18 | Stop reason: HOSPADM

## 2025-07-14 RX ORDER — IODIXANOL 270 MG/ML
10 INJECTION, SOLUTION INTRAVASCULAR
Status: COMPLETED | OUTPATIENT
Start: 2025-07-14 | End: 2025-07-14

## 2025-07-14 RX ORDER — LIDOCAINE HYDROCHLORIDE 20 MG/ML
INJECTION, SOLUTION EPIDURAL; INFILTRATION; INTRACAUDAL; PERINEURAL
Status: COMPLETED | OUTPATIENT
Start: 2025-07-14 | End: 2025-07-14

## 2025-07-14 RX ADMIN — PIPERACILLIN SODIUM AND TAZOBACTAM SODIUM 3.38 G: 3; .375 INJECTION, SOLUTION INTRAVENOUS at 16:51

## 2025-07-14 RX ADMIN — PIPERACILLIN SODIUM AND TAZOBACTAM SODIUM 3.38 G: 3; .375 INJECTION, SOLUTION INTRAVENOUS at 22:01

## 2025-07-14 RX ADMIN — MORPHINE SULFATE 2 MG: 2 INJECTION, SOLUTION INTRAMUSCULAR; INTRAVENOUS at 13:52

## 2025-07-14 RX ADMIN — PIPERACILLIN SODIUM AND TAZOBACTAM SODIUM 3.38 G: 3; .375 INJECTION, SOLUTION INTRAVENOUS at 11:36

## 2025-07-14 RX ADMIN — MORPHINE SULFATE 1 MG: 2 INJECTION, SOLUTION INTRAMUSCULAR; INTRAVENOUS at 17:43

## 2025-07-14 RX ADMIN — IODIXANOL 10 ML: 270 INJECTION, SOLUTION INTRAVASCULAR at 14:58

## 2025-07-14 RX ADMIN — MORPHINE SULFATE 2 MG: 2 INJECTION, SOLUTION INTRAMUSCULAR; INTRAVENOUS at 21:45

## 2025-07-14 RX ADMIN — LIDOCAINE HYDROCHLORIDE 10 ML: 20 INJECTION, SOLUTION EPIDURAL; INFILTRATION; INTRACAUDAL; PERINEURAL at 14:32

## 2025-07-14 RX ADMIN — DEXTROSE AND SODIUM CHLORIDE 75 ML/HR: 5; 450 INJECTION, SOLUTION INTRAVENOUS at 11:56

## 2025-07-14 ASSESSMENT — ACTIVITIES OF DAILY LIVING (ADL)
HEARING - RIGHT EAR: FUNCTIONAL
WALKS IN HOME: NEEDS ASSISTANCE
HEARING - LEFT EAR: FUNCTIONAL
ASSISTIVE_DEVICE: TRANSFER BELT;WALKER
GROOMING: NEEDS ASSISTANCE
TOILETING: NEEDS ASSISTANCE
FEEDING YOURSELF: NEEDS ASSISTANCE
BATHING: NEEDS ASSISTANCE
ADEQUATE_TO_COMPLETE_ADL: YES
PATIENT'S MEMORY ADEQUATE TO SAFELY COMPLETE DAILY ACTIVITIES?: YES
DRESSING YOURSELF: NEEDS ASSISTANCE
JUDGMENT_ADEQUATE_SAFELY_COMPLETE_DAILY_ACTIVITIES: YES

## 2025-07-14 ASSESSMENT — COGNITIVE AND FUNCTIONAL STATUS - GENERAL
MOBILITY SCORE: 13
DAILY ACTIVITIY SCORE: 16
DRESSING REGULAR LOWER BODY CLOTHING: A LITTLE
TOILETING: A LOT
STANDING UP FROM CHAIR USING ARMS: A LOT
EATING MEALS: A LITTLE
CLIMB 3 TO 5 STEPS WITH RAILING: TOTAL
TURNING FROM BACK TO SIDE WHILE IN FLAT BAD: A LITTLE
PERSONAL GROOMING: A LITTLE
DRESSING REGULAR UPPER BODY CLOTHING: A LITTLE
MOVING FROM LYING ON BACK TO SITTING ON SIDE OF FLAT BED WITH BEDRAILS: A LITTLE
WALKING IN HOSPITAL ROOM: A LOT
MOVING TO AND FROM BED TO CHAIR: A LOT
HELP NEEDED FOR BATHING: A LOT

## 2025-07-14 ASSESSMENT — PAIN DESCRIPTION - LOCATION: LOCATION: BACK

## 2025-07-14 ASSESSMENT — PAIN SCALES - GENERAL
PAINLEVEL_OUTOF10: 0 - NO PAIN
PAINLEVEL_OUTOF10: 7
PAINLEVEL_OUTOF10: 0 - NO PAIN
PAINLEVEL_OUTOF10: 0 - NO PAIN
PAINLEVEL_OUTOF10: 8
PAINLEVEL_OUTOF10: 0 - NO PAIN

## 2025-07-14 ASSESSMENT — PAIN DESCRIPTION - DESCRIPTORS: DESCRIPTORS: ACHING

## 2025-07-14 ASSESSMENT — PAIN - FUNCTIONAL ASSESSMENT
PAIN_FUNCTIONAL_ASSESSMENT: 0-10
PAIN_FUNCTIONAL_ASSESSMENT: 0-10
PAIN_FUNCTIONAL_ASSESSMENT: WONG-BAKER FACES
PAIN_FUNCTIONAL_ASSESSMENT: 0-10

## 2025-07-14 ASSESSMENT — PAIN DESCRIPTION - ORIENTATION: ORIENTATION: RIGHT

## 2025-07-14 ASSESSMENT — PAIN SCALES - WONG BAKER: WONGBAKER_NUMERICALRESPONSE: NO HURT

## 2025-07-14 NOTE — CONSULTS
"Inpatient consult to Cardiology  Consult performed by: Bess Fernandez MD  Consult ordered by: MARGO Torres-CNP  Reason for consult: Elevated troponin        History Of Present Illness:    Delia Mckeon is a 89 y.o. female presenting with nausea, vomiting and altered mental status.    Found to have septic shock with gram-negative bacteremia, possible perforation of the ureter.  We were consulted for abnormal troponin.  No chest pain has been reported.  History obtained from son and daughter at bedside.  Patient had some mid back and low back pain but currently none.  She is lethargic but does wake up to stimulus and responded no to pain.    ECG shows wandering atrial rhythm without ST-T changes.  pMHx of HTN . She was treated with antibiotics a week ago for a UTI but over the last few days PTA she had been vomiting.     Last Recorded Vitals:  Vitals:    07/14/25 1400 07/14/25 1415 07/14/25 1430 07/14/25 1445   BP: 101/53 100/50 105/58 (!) 111/39   Pulse: 73 72 70 67   Resp:  14 15 15   Temp:  36.7 °C (98 °F)     TempSrc:  Temporal     SpO2: 99% 99% 98% 97%   Weight:  72.1 kg (158 lb 15.2 oz)     Height:  (!) 1.549 m (5' 0.98\")         Last Labs:  CBC - 7/13/2025:  2:44 PM  16.9 12.6 398    35.5      CMP - 7/13/2025:  2:44 PM  10.9 6.9 15 --- 1.1   _ 3.5 11 65      PTT - No results in last year.  _   _ _     Troponin I, High Sensitivity   Date/Time Value Ref Range Status   07/13/2025 07:48  (HH) 0 - 13 ng/L Final     Comment:     Previous result verified on 7/13/2025 1752 on specimen/case [id number] called with component [facility] for procedure Troponin, High Sensitivity, 1 Hour with value 636 ng/L.   07/13/2025 04:59  (HH) 0 - 13 ng/L Final   07/13/2025 02:44 PM 45 (H) 0 - 13 ng/L Final     BNP   Date/Time Value Ref Range Status   07/13/2025 02:44  (H) 0 - 99 pg/mL Final     Hemoglobin A1C   Date/Time Value Ref Range Status   11/12/2024 02:27 PM 5.5 4.0 - 5.6 % Final      Last " "I/O:  I/O last 3 completed shifts:  In: 1000 (13.9 mL/kg) [IV Piggyback:1000]  Out: - (0 mL/kg)   Weight: 72.1 kg     Past Cardiology Tests (Last 3 Years):  EKG:  ECG 12 lead 07/13/2025 (Preliminary)    Echo:  No results found for this or any previous visit from the past 1095 days.    Ejection Fractions:  No results found for: \"EF\"  Cath:  No results found for this or any previous visit from the past 1095 days.    Stress Test:  No results found for this or any previous visit from the past 1095 days.    Cardiac Imaging:  No results found for this or any previous visit from the past 1095 days.      Past Medical History:  She has a past medical history of Hypertension.    Past Surgical History:  She has no past surgical history on file.      Social History:  She reports that she has never smoked. She has never used smokeless tobacco. She reports that she does not drink alcohol and does not use drugs.    Family History:  Family History[1]     Allergies:  Bisphosphonates    Inpatient Medications:  Scheduled Medications[2]  PRN Medications[3]  Continuous Medications[4]  Outpatient Medications:  Current Outpatient Medications   Medication Instructions    benazepriL-hydrochlorothiazide (Lotensin HCT) 20-12.5 mg tablet 1 tablet, oral, Daily    nitrofurantoin (Macrodantin) 100 mg capsule 100 capsules, oral, 2 times daily    propranolol (INDERAL) 80 mg, oral, 2 times daily    verapamil SR (CALAN-SR) 240 mg, oral, Daily, Do not crush or chew.       Physical Exam:  Physical Exam  Vitals reviewed.   Constitutional:       Appearance: Normal appearance.   Neck:      Vascular: No carotid bruit or JVD.   Cardiovascular:      Rate and Rhythm: Normal rate and regular rhythm.      Pulses: Normal pulses.      Heart sounds: Normal heart sounds, S1 normal and S2 normal.   Pulmonary:      Effort: Pulmonary effort is normal. No respiratory distress.      Breath sounds: No wheezing or rales.   Abdominal:      General: Abdomen is flat.      " Palpations: Abdomen is soft.   Musculoskeletal:      Right lower leg: No edema.      Left lower leg: No edema.   Skin:     General: Skin is warm.   Neurological:      Mental Status: She is oriented to person, place, and time and easily aroused. Mental status is at baseline. She is lethargic.   Psychiatric:         Mood and Affect: Mood normal.         Behavior: Behavior normal.           Assessment/Plan   1-elevated troponin-nonischemic myocardial injury in a patient with septic shock, UTI and possible bacteremia, perforated ureter.    No further cardiac workup would be indicated at this time.  Recommend treatment of underlying medical condition.  Cardiology will sign off.  Please call with any questions.    Peripheral IV 07/13/25 20 G Right Antecubital (Active)   Site Assessment Clean;Dry;Intact 07/14/25 1418   Dressing Type Transparent 07/14/25 1418   Line Status Flushed 07/14/25 1418   Dressing Status Clean;Dry;Occlusive 07/14/25 1418   Number of days: 1       Code Status:  No Order        Bess Fernandez MD         [1] No family history on file.  [2]   Scheduled medications   Medication Dose Route Frequency    perflutren lipid microspheres  0.5-10 mL of dilution intravenous Once in imaging    piperacillin-tazobactam  3.375 g intravenous q6h   [3]   PRN medications   Medication    morphine    morphine    ondansetron   [4]   Continuous Medications   Medication Dose Last Rate    dextrose 5%-0.45 % sodium chloride  75 mL/hr 75 mL/hr (07/14/25 8946)

## 2025-07-14 NOTE — PROGRESS NOTES
Delia Mckeon is a 89 y.o. female admitted for Sepsis secondary to UTI (Multi). Pharmacy reviewed the patient's etnyp-ei-qowormdzu medications and allergies for accuracy.    The list below reflects the PTA list prior to pharmacy medication history. A summary a changes to the PTA medication list has been listed below. Please review each medication in order reconciliation for additional clarification and justification.    Source of information: surescripts, talked to family - no changes!    Medications added:    Medications modified:    Medications to be removed:    Medications of concern:      Prior to Admission Medications   Prescriptions Last Dose Informant Patient Reported? Taking?   benazepriL-hydrochlorothiazide (Lotensin HCT) 20-12.5 mg tablet   Yes No   Sig: Take 1 tablet by mouth once daily.   nitrofurantoin (Macrodantin) 100 mg capsule   Yes No   Sig: Take 100 capsules (10,000 mg) by mouth 2 times a day.   propranolol (Inderal) 80 mg tablet   Yes No   Sig: Take 1 tablet (80 mg) by mouth 2 times a day.   verapamil SR (Calan-SR) 240 mg ER tablet   Yes No   Sig: Take 1 tablet (240 mg) by mouth once daily. Do not crush or chew.      Facility-Administered Medications: None       OSIRIS BROWN

## 2025-07-14 NOTE — POST-PROCEDURE NOTE
Interventional Radiology Brief Postprocedure Note    Attending: Lavell Larry MD      Assistant: none    Diagnosis: obstructing stones    Description of procedure: right pcn     Anesthesia:  Local    Complications: None    Estimated Blood Loss: none      specimens collected      See detailed result report with images in PACS.    The patient tolerated the procedure well without incident or complication.

## 2025-07-14 NOTE — PROGRESS NOTES
Patient signed out to me at 1600 hrs. for follow-up on CT imaging.  Patient was signed out as UTI with sepsis.  Patient was given Zosyn.  She has been hemodynamically stable.  Initial cardiac workup was normal.  Patient's delta troponin however did return at 636 which is elevated from 45.  She is not complaining of chest pain.  This could be due to sepsis.  Patient CT of the abdomen pelvis with IV contrast showed she has a possible perforation of the posterior aspect of the right distal ureter associated with kidney stone.  Discussed the case with urology Dr. BROCK who stated that was the case for interventional radiology.  I discussed this case with Dr. PRESSLEY who stated that they did not think that this was a perforation and that the patient could be seen tomorrow by IR.  After long discussion I did explain to him without urology coverage that likely nobody would admit this patient here overnight with a perforated ureter and septic kidney stone.  I did again speak with Dr. BROCK.  He stated that the patient could be medically admitted to medicine and currently we are awaiting a bed assignment.  Repeat troponin came back at 587.  I discussed this possibility of given heparin with the admitting physician Dr. Harris however we determined to hold off on this given the patient might have a perforation of her ureter and might need surgical intervention.  All findings were discussed with family.

## 2025-07-14 NOTE — DISCHARGE INSTRUCTIONS
The hydrochlorothiazide component of your benazepril-hydrochlorothiazide combination tablet is lowering your sodium levels. This is a common occurrence in elderly patients taking hydrochlorothiazide. We need to stop this medication so that it does not drop the sodium levels which can be dangerous. To this end, I have discontinued the combination tablet and replaced the benazepril component with its cousin, lisinopril, which is more readily available in the [place].

## 2025-07-14 NOTE — H&P
"[facility] MEDICINE HISTORY AND PHYSICAL    Subjective     Delia Mckeon is a 89 y.o. female with PMHx of HTN who presented with nausea and vomiting. She was treated with antibiotics a week ago for a UTI but over the last few days PTA she had been vomiting. At the bedside she is deeply asleep and son and  are at the bedside. Remainder of ROS reviewed and negative except as indicated in HPI.     Objective     ED workup was significant for blood glucose 169, sodium 130, potassium 3.4, magnesium 1.28, , troponins 45/636/587, lactate 2.9/1.0, WBC 16.9k. UA was indicative of infection. CXR revealed cardiomegaly with perihilar vascular congestion and small bilateral pleural effusion. CT abdomen/pelvis revealed obstructive right distal ureter stone with associated hydroureteronephrosis and focal perforation the posterior wall of the mid-ureter. The pt was hypotensive to 90/41 mmHg and hypoxic and placed on 2L O2. The pt received Zosyn, IV magnesium and NS IV fluids. Urology was contacted and recommended evaluation by interventional radiology, who did not think this was a perforation and recommended medical management pending transfer to [facility]. Heparin gtt was discussed with the admitting physician Dr. Harris and deferred given the perforation of her ureter with possible surgical intervention.  IR recommended possible percutaneous nephrostomy which was tentatively planned for today.     Visit Vitals  BP (!) 106/46   Pulse 71   Temp 36.1 °C (97 °F) (Skin)   Resp 18   Ht (!) 1.549 m (5' 1\")   Wt 72.1 kg (158 lb 15.2 oz)   SpO2 97%   BMI 30.03 kg/m²   OB Status Postmenopausal   Smoking Status Never   BSA 1.76 m²       Vitals:    07/13/25 1407   Weight: 72.1 kg (158 lb 15.2 oz)       Scheduled medications  Scheduled Medications[1]  Continuous medications  Continuous Medications[2]  PRN medications  PRN Medications[3]    The following labwork was reviewed:  Results for orders placed or performed " during the hospital encounter of 07/13/25 (from the past 24 hours)   CBC and Auto Differential   Result Value Ref Range    WBC 16.9 (H) 4.4 - 11.3 x10*3/uL    nRBC 0.0 0.0 - 0.0 /100 WBCs    RBC 3.89 (L) 4.00 - 5.20 x10*6/uL    Hemoglobin 12.6 12.0 - 16.0 g/dL    Hematocrit 35.5 (L) 36.0 - 46.0 %    MCV 91 80 - 100 fL    MCH 32.4 26.0 - 34.0 pg    MCHC 35.5 32.0 - 36.0 g/dL    RDW 12.2 11.5 - 14.5 %    Platelets 398 150 - 450 x10*3/uL    Neutrophils % 94.3 40.0 - 80.0 %    Immature Granulocytes %, Automated 1.3 (H) 0.0 - 0.9 %    Lymphocytes % 3.0 13.0 - 44.0 %    Monocytes % 1.1 2.0 - 10.0 %    Eosinophils % 0.1 0.0 - 6.0 %    Basophils % 0.2 0.0 - 2.0 %    Neutrophils Absolute 15.95 (H) 1.60 - 5.50 x10*3/uL    Immature Granulocytes Absolute, Automated 0.22 0.00 - 0.50 x10*3/uL    Lymphocytes Absolute 0.50 (L) 0.80 - 3.00 x10*3/uL    Monocytes Absolute 0.18 0.05 - 0.80 x10*3/uL    Eosinophils Absolute 0.02 0.00 - 0.40 x10*3/uL    Basophils Absolute 0.04 0.00 - 0.10 x10*3/uL   Basic metabolic panel   Result Value Ref Range    Glucose 169 (H) 74 - 99 mg/dL    Sodium 130 (L) 136 - 145 mmol/L    Potassium 3.4 (L) 3.5 - 5.3 mmol/L    Chloride 92 (L) 98 - 107 mmol/L    Bicarbonate 26 21 - 32 mmol/L    Anion Gap 15 10 - 20 mmol/L    Urea Nitrogen 18 6 - 23 mg/dL    Creatinine 1.01 0.50 - 1.05 mg/dL    eGFR 48 (L) >60 mL/min/1.73m*2    Calcium 10.9 (H) 8.6 - 10.3 mg/dL   Magnesium   Result Value Ref Range    Magnesium 1.28 (L) 1.60 - 2.40 mg/dL   Hepatic function panel   Result Value Ref Range    Albumin 3.5 3.4 - 5.0 g/dL    Bilirubin, Total 1.1 0.0 - 1.2 mg/dL    Bilirubin, Direct 0.3 0.0 - 0.3 mg/dL    Alkaline Phosphatase 65 33 - 136 U/L    ALT 11 7 - 45 U/L    AST 15 9 - 39 U/L    Total Protein 6.9 6.4 - 8.2 g/dL   Lactate   Result Value Ref Range    Lactate 2.9 (H) 0.4 - 2.0 mmol/L   B-Type Natriuretic Peptide   Result Value Ref Range     (H) 0 - 99 pg/mL   Blood Culture    Specimen: Peripheral Venipuncture;  Blood culture   Result Value Ref Range    Blood Culture       Identification and susceptibility testing to follow    Gram Stain Gram negative bacilli (AA)     Gram Stain Gram negative bacilli (AA)    Troponin I, High Sensitivity, Initial   Result Value Ref Range    Troponin I, High Sensitivity 45 (H) 0 - 13 ng/L   Blood Culture    Specimen: Peripheral Venipuncture; Blood culture   Result Value Ref Range    Blood Culture       Identification and susceptibility testing to follow    Gram Stain Gram negative bacilli (AA)    Urinalysis with Reflex Culture and Microscopic   Result Value Ref Range    Color, Urine Yellow Light-Yellow, Yellow, Dark-Yellow    Appearance, Urine Clear Clear    Specific Gravity, Urine 1.015 1.005 - 1.035    pH, Urine 6.0 5.0, 5.5, 6.0, 6.5, 7.0, 7.5, 8.0    Protein, Urine 100 (2+) (A) NEGATIVE, 10 (TRACE), 20 (TRACE) mg/dL    Glucose, Urine Normal Normal mg/dL    Blood, Urine 0.06 (1+) (A) NEGATIVE mg/dL    Ketones, Urine 10 (1+) (A) NEGATIVE mg/dL    Bilirubin, Urine NEGATIVE NEGATIVE mg/dL    Urobilinogen, Urine 4 (2+) (A) Normal mg/dL    Nitrite, Urine NEGATIVE NEGATIVE    Leukocyte Esterase, Urine 250 Zully/uL (A) NEGATIVE   Extra Urine Gray Tube   Result Value Ref Range    Extra Tube     Microscopic Only, Urine   Result Value Ref Range    WBC, Urine 21-50 (A) 1-5, NONE /HPF    WBC Clumps, Urine OCCASIONAL Reference range not established. /HPF    RBC, Urine 6-10 (A) NONE, 1-2, 3-5 /HPF    Squamous Epithelial Cells, Urine 1-9 (SPARSE) Reference range not established. /HPF    Mucus, Urine FEW Reference range not established. /LPF    Hyaline Casts, Urine OCCASIONAL (A) NONE /LPF   ECG 12 lead   Result Value Ref Range    Ventricular Rate 97 BPM    Atrial Rate 99 BPM    GA Interval 207 ms    QRS Duration 88 ms    QT Interval 346 ms    QTC Calculation(Bazett) 440 ms    P Axis 73 degrees    R Axis -23 degrees    T Axis 116 degrees    QRS Count 15 beats    Q Onset 253 ms    T Offset 426 ms    QTC  [facility] 405 ms   Blood Gas Venous Full Panel   Result Value Ref Range    POCT pH, Venous 7.46 (H) 7.33 - 7.43 pH    POCT pCO2, Venous 38 (L) 41 - 51 mm Hg    POCT pO2, Venous 55 (H) 35 - 45 mm Hg    POCT SO2, Venous 91 (H) 45 - 75 %    POCT Oxy Hemoglobin, Venous 88.0 (H) 45.0 - 75.0 %    POCT Hematocrit Calculated, Venous 36.0 36.0 - 46.0 %    POCT Sodium, Venous 127 (L) 136 - 145 mmol/L    POCT Potassium, Venous 2.6 (LL) 3.5 - 5.3 mmol/L    POCT Chloride, Venous 96 (L) 98 - 107 mmol/L    POCT Ionized Calicum, Venous 1.37 (H) 1.10 - 1.33 mmol/L    POCT Glucose, Venous 144 (H) 74 - 99 mg/dL    POCT Lactate, Venous 1.2 0.4 - 2.0 mmol/L    POCT Base Excess, Venous 3.1 (H) -2.0 - 3.0 mmol/L    POCT HCO3 Calculated, Venous 27.0 (H) 22.0 - 26.0 mmol/L    POCT Hemoglobin, Venous 12.1 12.0 - 16.0 g/dL    POCT Anion Gap, Venous 7.0 (L) 10.0 - 25.0 mmol/L    Patient Temperature 37.0 degrees Celsius    FiO2 21 %   Troponin, High Sensitivity, 1 Hour   Result Value Ref Range    Troponin I, High Sensitivity 636 (HH) 0 - 13 ng/L   Lactate   Result Value Ref Range    Lactate 1.0 0.4 - 2.0 mmol/L   Troponin I, High Sensitivity   Result Value Ref Range    Troponin I, High Sensitivity 587 (HH) 0 - 13 ng/L       The following imaging was reviewed:  CT abdomen pelvis w IV contrast  Result Date: 7/13/2025  1. Obstructing calculi in the distal right ureter resulting in moderate hydronephrosis and a focal perforation the posterior wall of the mid ureter evidenced by increase in quantity of hyperdense fluid and extraluminal contrast adjacent to the may ureter as described below and above.   1 cm x 0.7 cm calculus obstructing the distal right ureter and several small calculi slightly distal to this. There is moderate hydroureteronephrosis and delayed nephrogram. There is diffuse thickening of the ureteral wall with hyperenhancement. There is loss of definition of the posterior wall of the right mid ureter adjacent to significant amount  of periureteral fluid (axial image 76, series 2). On multiple subsequent delayed images there is increased quantity of fluid surrounding this portion of the ureter that is of high density and layering contrast can be seen on the 1st delayed phase image obtained beyond the wall of the ureter (axial image 80, series 7) consistent with a focal perforation in the posterior wall.   2. Cholelithiasis without evidence of acute cholecystitis.     MACRO: Nicolas Swift discussed the significance and urgency of this critical finding by EPIC secure chat with Dr. Germain on 7/13/2025 at 6:01 pm.  (**-RCF-**) Findings:  See findings.   Signed by: Nicolas Swift 7/13/2025 6:02 PM Dictation workstation:   [id number]    XR chest 1 view  Result Date: 7/13/2025  1. Cardiomegaly with perihilar vascular congestion and small bilateral pleural effusion. Findings could be related to   Signed by: Gilberto Eng 7/13/2025 4:21 PM Dictation workstation:   [id number]      Medical History[4]    Surgical History[5]    Social History[6]    Family History[7]    Allergies  Bisphosphonates    Constitutional: Well developed, asleep   Eyes: pt is asleep   ENMT: mucous membranes moist, no lesions seen   Head/Neck: Neck supple, no apparent injury, head atraumatic   Respiratory/Thorax: CTAB, good chest expansion, respirations even and unlabored   Cardiovascular: Regular rate and rhythm, no murmurs/rubs/gallops, normal S1 and S 2   Gastrointestinal: Abdomen nondistended, pt obese   Musculoskeletal: ROM intact, no joint swelling   Extremities: no cyanosis, contusions or clubbing, or edema   Neurological: pt is asleep   Psychological: pt is asleep   Skin: Warm and dry, no lesions, no rashes       Assessment/Plan     Obstructive right distal ureter stone with associated hydroureteronephrosis   Ureteral wall perforation  GNB bacteremia  Continue empiric Zosyn pending urine and blood C+S  IR consulted and recommend possible percutaneous nephrostomy  which was tentatively planned for today    NSTEMI  Troponins 45/636/587  Heparin gtt was discussed with the admitting physician Dr. Harris and deferred given the perforation of her ureter with possible surgical intervention  Will consult cardiology  CXR revealed cardiomegaly with perihilar vascular congestion and small bilateral pleural effusion  In setting of NSTEMI will order echocardiogram    Hyperglycemia  No hx of diabetes, A1c was 5.5 last November    Mild hyponatremia  Trend in am, not much data in the EMR    Hypomagnesemia  Repleted IV in the ED, repeat level in the morning    Hypotension  Hold home BP meds for now and monitor BP    DVT ppx: held for possible IR procedure today    Discharge disposition  Discharge when medically stable and pending PT/OT noreen Stallings CNP  [facility]    The pt's case and plan of care was discussed with the ED provider. The ED notes were reviewed in detail, as were prior outpatient and patient notes as part of the admission chart review. The pt is at high risk for cardiovascular and infectious events including sepsis/seizures due to NSTEMI and infectious processes. The decision was made to escalate care and admit the pt under inpatient status.              [1] piperacillin-tazobactam, 3.375 g, intravenous, q6h  [2] dextrose 5%-0.45 % sodium chloride, 75 mL/hr, Last Rate: 75 mL/hr (07/14/25 1156)  [3] [4]   Past Medical History:  Diagnosis Date    Hypertension    [5] History reviewed. No pertinent surgical history.  [6]   Social History  Tobacco Use    Smoking status: Never    Smokeless tobacco: Never   [7] No family history on file.

## 2025-07-15 ENCOUNTER — APPOINTMENT (OUTPATIENT)
Dept: CARDIOLOGY | Facility: HOSPITAL | Age: 89
DRG: 871 | End: 2025-07-15
Payer: MEDICARE

## 2025-07-15 LAB
ANION GAP SERPL CALC-SCNC: 12 MMOL/L (ref 10–20)
AORTIC VALVE MEAN GRADIENT: 3 MMHG
AORTIC VALVE PEAK VELOCITY: 1.35 M/S
AV PEAK GRADIENT: 7 MMHG
AVA (PEAK VEL): 1.82 CM2
AVA (VTI): 2.14 CM2
BUN SERPL-MCNC: 23 MG/DL (ref 6–23)
CALCIUM SERPL-MCNC: 9.7 MG/DL (ref 8.6–10.3)
CHLORIDE SERPL-SCNC: 97 MMOL/L (ref 98–107)
CO2 SERPL-SCNC: 25 MMOL/L (ref 21–32)
CREAT SERPL-MCNC: 1.03 MG/DL (ref 0.5–1.05)
EGFRCR SERPLBLD CKD-EPI 2021: 47 ML/MIN/1.73M*2
EJECTION FRACTION APICAL 4 CHAMBER: 66.3
EJECTION FRACTION: 68 %
ERYTHROCYTE [DISTWIDTH] IN BLOOD BY AUTOMATED COUNT: 12.6 % (ref 11.5–14.5)
GLUCOSE SERPL-MCNC: 164 MG/DL (ref 74–99)
HCT VFR BLD AUTO: 29.8 % (ref 36–46)
HGB BLD-MCNC: 10.1 G/DL (ref 12–16)
HOLD SPECIMEN: NORMAL
LEFT ATRIUM VOLUME AREA LENGTH INDEX BSA: 38.6 ML/M2
LEFT VENTRICLE INTERNAL DIMENSION DIASTOLE: 3.3 CM (ref 3.5–6)
LEFT VENTRICULAR OUTFLOW TRACT DIAMETER: 2 CM
LV EJECTION FRACTION BIPLANE: 68 %
MAGNESIUM SERPL-MCNC: 1.5 MG/DL (ref 1.6–2.4)
MAGNESIUM SERPL-MCNC: 1.72 MG/DL (ref 1.6–2.4)
MCH RBC QN AUTO: 31.9 PG (ref 26–34)
MCHC RBC AUTO-ENTMCNC: 33.9 G/DL (ref 32–36)
MCV RBC AUTO: 94 FL (ref 80–100)
MITRAL VALVE E/A RATIO: 1.03
NRBC BLD-RTO: 0 /100 WBCS (ref 0–0)
PLATELET # BLD AUTO: 371 X10*3/UL (ref 150–450)
POTASSIUM SERPL-SCNC: 2.7 MMOL/L (ref 3.5–5.3)
POTASSIUM SERPL-SCNC: 3.7 MMOL/L (ref 3.5–5.3)
RBC # BLD AUTO: 3.17 X10*6/UL (ref 4–5.2)
RIGHT VENTRICLE FREE WALL PEAK S': 16.3 CM/S
RIGHT VENTRICLE PEAK SYSTOLIC PRESSURE: 30 MMHG
SODIUM SERPL-SCNC: 131 MMOL/L (ref 136–145)
TRICUSPID ANNULAR PLANE SYSTOLIC EXCURSION: 1.7 CM
WBC # BLD AUTO: 16.8 X10*3/UL (ref 4.4–11.3)

## 2025-07-15 PROCEDURE — 84132 ASSAY OF SERUM POTASSIUM: CPT

## 2025-07-15 PROCEDURE — 2500000004 HC RX 250 GENERAL PHARMACY W/ HCPCS (ALT 636 FOR OP/ED): Performed by: EMERGENCY MEDICINE

## 2025-07-15 PROCEDURE — 2500000004 HC RX 250 GENERAL PHARMACY W/ HCPCS (ALT 636 FOR OP/ED): Performed by: NURSE PRACTITIONER

## 2025-07-15 PROCEDURE — 83735 ASSAY OF MAGNESIUM: CPT

## 2025-07-15 PROCEDURE — 83735 ASSAY OF MAGNESIUM: CPT | Performed by: NURSE PRACTITIONER

## 2025-07-15 PROCEDURE — C8929 TTE W OR WO FOL WCON,DOPPLER: HCPCS

## 2025-07-15 PROCEDURE — 97161 PT EVAL LOW COMPLEX 20 MIN: CPT | Mod: GP | Performed by: PHYSICAL THERAPIST

## 2025-07-15 PROCEDURE — 85027 COMPLETE CBC AUTOMATED: CPT | Performed by: NURSE PRACTITIONER

## 2025-07-15 PROCEDURE — 2500000004 HC RX 250 GENERAL PHARMACY W/ HCPCS (ALT 636 FOR OP/ED): Performed by: STUDENT IN AN ORGANIZED HEALTH CARE EDUCATION/TRAINING PROGRAM

## 2025-07-15 PROCEDURE — 2500000004 HC RX 250 GENERAL PHARMACY W/ HCPCS (ALT 636 FOR OP/ED): Performed by: INTERNAL MEDICINE

## 2025-07-15 PROCEDURE — 99232 SBSQ HOSP IP/OBS MODERATE 35: CPT | Performed by: NURSE PRACTITIONER

## 2025-07-15 PROCEDURE — 93306 TTE W/DOPPLER COMPLETE: CPT | Performed by: INTERNAL MEDICINE

## 2025-07-15 PROCEDURE — 2060000001 HC INTERMEDIATE ICU ROOM DAILY

## 2025-07-15 PROCEDURE — 36415 COLL VENOUS BLD VENIPUNCTURE: CPT

## 2025-07-15 PROCEDURE — 80048 BASIC METABOLIC PNL TOTAL CA: CPT | Performed by: NURSE PRACTITIONER

## 2025-07-15 PROCEDURE — 2500000002 HC RX 250 W HCPCS SELF ADMINISTERED DRUGS (ALT 637 FOR MEDICARE OP, ALT 636 FOR OP/ED): Performed by: STUDENT IN AN ORGANIZED HEALTH CARE EDUCATION/TRAINING PROGRAM

## 2025-07-15 PROCEDURE — 97165 OT EVAL LOW COMPLEX 30 MIN: CPT | Mod: GO

## 2025-07-15 PROCEDURE — 2500000004 HC RX 250 GENERAL PHARMACY W/ HCPCS (ALT 636 FOR OP/ED)

## 2025-07-15 PROCEDURE — 36415 COLL VENOUS BLD VENIPUNCTURE: CPT | Performed by: NURSE PRACTITIONER

## 2025-07-15 RX ORDER — POTASSIUM CHLORIDE 14.9 MG/ML
20 INJECTION INTRAVENOUS
Status: COMPLETED | OUTPATIENT
Start: 2025-07-15 | End: 2025-07-15

## 2025-07-15 RX ORDER — METOPROLOL TARTRATE 1 MG/ML
5 INJECTION, SOLUTION INTRAVENOUS ONCE
Status: COMPLETED | OUTPATIENT
Start: 2025-07-15 | End: 2025-07-15

## 2025-07-15 RX ORDER — POTASSIUM CHLORIDE 20 MEQ/1
40 TABLET, EXTENDED RELEASE ORAL ONCE
Status: COMPLETED | OUTPATIENT
Start: 2025-07-15 | End: 2025-07-15

## 2025-07-15 RX ORDER — LANOLIN ALCOHOL/MO/W.PET/CERES
400 CREAM (GRAM) TOPICAL ONCE
Status: COMPLETED | OUTPATIENT
Start: 2025-07-15 | End: 2025-07-15

## 2025-07-15 RX ORDER — ENOXAPARIN SODIUM 100 MG/ML
40 INJECTION SUBCUTANEOUS DAILY
Status: DISCONTINUED | OUTPATIENT
Start: 2025-07-15 | End: 2025-07-15 | Stop reason: DRUGHIGH

## 2025-07-15 RX ORDER — CEFTRIAXONE 2 G/50ML
2 INJECTION, SOLUTION INTRAVENOUS EVERY 24 HOURS
Status: DISCONTINUED | OUTPATIENT
Start: 2025-07-15 | End: 2025-07-16

## 2025-07-15 RX ORDER — ENOXAPARIN SODIUM 100 MG/ML
30 INJECTION SUBCUTANEOUS DAILY
Status: DISCONTINUED | OUTPATIENT
Start: 2025-07-15 | End: 2025-07-18 | Stop reason: HOSPADM

## 2025-07-15 RX ORDER — CEFEPIME HYDROCHLORIDE 1 G/50ML
1 INJECTION, SOLUTION INTRAVENOUS EVERY 12 HOURS
Status: DISCONTINUED | OUTPATIENT
Start: 2025-07-15 | End: 2025-07-15

## 2025-07-15 RX ADMIN — MORPHINE SULFATE 2 MG: 2 INJECTION, SOLUTION INTRAMUSCULAR; INTRAVENOUS at 21:11

## 2025-07-15 RX ADMIN — Medication 1 TABLET: at 21:38

## 2025-07-15 RX ADMIN — HUMAN ALBUMIN MICROSPHERES AND PERFLUTREN 0.5 ML: 10; .22 INJECTION, SOLUTION INTRAVENOUS at 08:07

## 2025-07-15 RX ADMIN — PIPERACILLIN SODIUM AND TAZOBACTAM SODIUM 3.38 G: 3; .375 INJECTION, SOLUTION INTRAVENOUS at 04:39

## 2025-07-15 RX ADMIN — ENOXAPARIN SODIUM 30 MG: 30 INJECTION SUBCUTANEOUS at 10:39

## 2025-07-15 RX ADMIN — POTASSIUM CHLORIDE 40 MEQ: 20 TABLET, EXTENDED RELEASE ORAL at 07:02

## 2025-07-15 RX ADMIN — POTASSIUM CHLORIDE 20 MEQ: 14.9 INJECTION, SOLUTION INTRAVENOUS at 10:44

## 2025-07-15 RX ADMIN — METOROPROLOL TARTRATE 5 MG: 5 INJECTION, SOLUTION INTRAVENOUS at 21:04

## 2025-07-15 RX ADMIN — POTASSIUM CHLORIDE 20 MEQ: 14.9 INJECTION, SOLUTION INTRAVENOUS at 07:06

## 2025-07-15 RX ADMIN — DEXTROSE AND SODIUM CHLORIDE 75 ML/HR: 5; 450 INJECTION, SOLUTION INTRAVENOUS at 01:43

## 2025-07-15 RX ADMIN — CEFTRIAXONE 2 G: 2 INJECTION, SOLUTION INTRAVENOUS at 10:37

## 2025-07-15 SDOH — ECONOMIC STABILITY: INCOME INSECURITY: IN THE PAST 12 MONTHS HAS THE ELECTRIC, GAS, OIL, OR WATER COMPANY THREATENED TO SHUT OFF SERVICES IN YOUR HOME?: NO

## 2025-07-15 SDOH — ECONOMIC STABILITY: FOOD INSECURITY: WITHIN THE PAST 12 MONTHS, YOU WORRIED THAT YOUR FOOD WOULD RUN OUT BEFORE YOU GOT THE MONEY TO BUY MORE.: NEVER TRUE

## 2025-07-15 SDOH — SOCIAL STABILITY: SOCIAL INSECURITY
WITHIN THE LAST YEAR, HAVE YOU BEEN RAPED OR FORCED TO HAVE ANY KIND OF SEXUAL ACTIVITY BY YOUR PARTNER OR EX-PARTNER?: NO

## 2025-07-15 SDOH — SOCIAL STABILITY: SOCIAL INSECURITY
WITHIN THE LAST YEAR, HAVE YOU BEEN KICKED, HIT, SLAPPED, OR OTHERWISE PHYSICALLY HURT BY YOUR PARTNER OR EX-PARTNER?: NO

## 2025-07-15 SDOH — SOCIAL STABILITY: SOCIAL INSECURITY: WITHIN THE LAST YEAR, HAVE YOU BEEN HUMILIATED OR EMOTIONALLY ABUSED IN OTHER WAYS BY YOUR PARTNER OR EX-PARTNER?: NO

## 2025-07-15 SDOH — ECONOMIC STABILITY: FOOD INSECURITY: WITHIN THE PAST 12 MONTHS, THE FOOD YOU BOUGHT JUST DIDN'T LAST AND YOU DIDN'T HAVE MONEY TO GET MORE.: NEVER TRUE

## 2025-07-15 SDOH — ECONOMIC STABILITY: HOUSING INSECURITY: IN THE LAST 12 MONTHS, WAS THERE A TIME WHEN YOU WERE NOT ABLE TO PAY THE MORTGAGE OR RENT ON TIME?: NO

## 2025-07-15 SDOH — SOCIAL STABILITY: SOCIAL INSECURITY: ARE THERE ANY APPARENT SIGNS OF INJURIES/BEHAVIORS THAT COULD BE RELATED TO ABUSE/NEGLECT?: NO

## 2025-07-15 SDOH — ECONOMIC STABILITY: HOUSING INSECURITY: IN THE PAST 12 MONTHS, HOW MANY TIMES HAVE YOU MOVED WHERE YOU WERE LIVING?: 0

## 2025-07-15 SDOH — SOCIAL STABILITY: SOCIAL INSECURITY: WITHIN THE LAST YEAR, HAVE YOU BEEN AFRAID OF YOUR PARTNER OR EX-PARTNER?: NO

## 2025-07-15 SDOH — HEALTH STABILITY: PHYSICAL HEALTH: ON AVERAGE, HOW MANY DAYS PER WEEK DO YOU ENGAGE IN MODERATE TO STRENUOUS EXERCISE (LIKE A BRISK WALK)?: 0 DAYS

## 2025-07-15 SDOH — SOCIAL STABILITY: SOCIAL INSECURITY: HAVE YOU HAD THOUGHTS OF HARMING ANYONE ELSE?: NO

## 2025-07-15 SDOH — ECONOMIC STABILITY: HOUSING INSECURITY: AT ANY TIME IN THE PAST 12 MONTHS, WERE YOU HOMELESS OR LIVING IN A SHELTER (INCLUDING NOW)?: NO

## 2025-07-15 SDOH — ECONOMIC STABILITY: FOOD INSECURITY: HOW HARD IS IT FOR YOU TO PAY FOR THE VERY BASICS LIKE FOOD, HOUSING, MEDICAL CARE, AND HEATING?: NOT HARD AT ALL

## 2025-07-15 SDOH — HEALTH STABILITY: PHYSICAL HEALTH: ON AVERAGE, HOW MANY MINUTES DO YOU ENGAGE IN EXERCISE AT THIS LEVEL?: 0 MIN

## 2025-07-15 SDOH — SOCIAL STABILITY: SOCIAL INSECURITY: DO YOU FEEL ANYONE HAS EXPLOITED OR TAKEN ADVANTAGE OF YOU FINANCIALLY OR OF YOUR PERSONAL PROPERTY?: NO

## 2025-07-15 SDOH — SOCIAL STABILITY: SOCIAL INSECURITY: HAS ANYONE EVER THREATENED TO HURT YOUR FAMILY OR YOUR PETS?: NO

## 2025-07-15 SDOH — SOCIAL STABILITY: SOCIAL INSECURITY: WERE YOU ABLE TO COMPLETE ALL THE BEHAVIORAL HEALTH SCREENINGS?: YES

## 2025-07-15 SDOH — SOCIAL STABILITY: SOCIAL INSECURITY: ARE YOU OR HAVE YOU BEEN THREATENED OR ABUSED PHYSICALLY, EMOTIONALLY, OR SEXUALLY BY ANYONE?: NO

## 2025-07-15 SDOH — SOCIAL STABILITY: SOCIAL INSECURITY: HAVE YOU HAD ANY THOUGHTS OF HARMING ANYONE ELSE?: NO

## 2025-07-15 SDOH — ECONOMIC STABILITY: TRANSPORTATION INSECURITY: IN THE PAST 12 MONTHS, HAS LACK OF TRANSPORTATION KEPT YOU FROM MEDICAL APPOINTMENTS OR FROM GETTING MEDICATIONS?: NO

## 2025-07-15 SDOH — SOCIAL STABILITY: SOCIAL INSECURITY: DOES ANYONE TRY TO KEEP YOU FROM HAVING/CONTACTING OTHER FRIENDS OR DOING THINGS OUTSIDE YOUR HOME?: NO

## 2025-07-15 SDOH — SOCIAL STABILITY: SOCIAL INSECURITY: ABUSE: ADULT

## 2025-07-15 SDOH — SOCIAL STABILITY: SOCIAL INSECURITY: DO YOU FEEL UNSAFE GOING BACK TO THE PLACE WHERE YOU ARE LIVING?: NO

## 2025-07-15 ASSESSMENT — ACTIVITIES OF DAILY LIVING (ADL)
LACK_OF_TRANSPORTATION: NO
BATHING_ASSISTANCE: MODERATE
LACK_OF_TRANSPORTATION: NO

## 2025-07-15 ASSESSMENT — LIFESTYLE VARIABLES
AUDIT-C TOTAL SCORE: 0
HOW OFTEN DO YOU HAVE 6 OR MORE DRINKS ON ONE OCCASION: NEVER
SKIP TO QUESTIONS 9-10: 1
AUDIT-C TOTAL SCORE: 0
HOW OFTEN DO YOU HAVE A DRINK CONTAINING ALCOHOL: NEVER
HOW MANY STANDARD DRINKS CONTAINING ALCOHOL DO YOU HAVE ON A TYPICAL DAY: PATIENT DOES NOT DRINK

## 2025-07-15 ASSESSMENT — COGNITIVE AND FUNCTIONAL STATUS - GENERAL
TOILETING: A LOT
MOBILITY SCORE: 13
TOILETING: A LOT
MOVING FROM LYING ON BACK TO SITTING ON SIDE OF FLAT BED WITH BEDRAILS: A LITTLE
CLIMB 3 TO 5 STEPS WITH RAILING: TOTAL
EATING MEALS: A LITTLE
STANDING UP FROM CHAIR USING ARMS: A LOT
PERSONAL GROOMING: A LITTLE
HELP NEEDED FOR BATHING: A LOT
DRESSING REGULAR LOWER BODY CLOTHING: A LITTLE
PERSONAL GROOMING: A LITTLE
WALKING IN HOSPITAL ROOM: A LOT
DRESSING REGULAR UPPER BODY CLOTHING: A LOT
PATIENT BASELINE BEDBOUND: NO
TOILETING: A LOT
MOVING TO AND FROM BED TO CHAIR: A LOT
DAILY ACTIVITIY SCORE: 15
MOBILITY SCORE: 11
STANDING UP FROM CHAIR USING ARMS: A LOT
STANDING UP FROM CHAIR USING ARMS: A LOT
DRESSING REGULAR LOWER BODY CLOTHING: A LOT
MOVING TO AND FROM BED TO CHAIR: A LOT
DRESSING REGULAR LOWER BODY CLOTHING: A LOT
HELP NEEDED FOR BATHING: A LOT
TURNING FROM BACK TO SIDE WHILE IN FLAT BAD: A LITTLE
EATING MEALS: A LITTLE
EATING MEALS: A LITTLE
MOVING FROM LYING ON BACK TO SITTING ON SIDE OF FLAT BED WITH BEDRAILS: A LOT
DAILY ACTIVITIY SCORE: 14
DRESSING REGULAR UPPER BODY CLOTHING: A LOT
DAILY ACTIVITIY SCORE: 14
DRESSING REGULAR UPPER BODY CLOTHING: A LOT
PERSONAL GROOMING: A LITTLE
WALKING IN HOSPITAL ROOM: A LOT
MOBILITY SCORE: 14
CLIMB 3 TO 5 STEPS WITH RAILING: TOTAL
MOVING FROM LYING ON BACK TO SITTING ON SIDE OF FLAT BED WITH BEDRAILS: A LITTLE
WALKING IN HOSPITAL ROOM: A LOT
HELP NEEDED FOR BATHING: A LOT
TURNING FROM BACK TO SIDE WHILE IN FLAT BAD: A LOT
MOVING TO AND FROM BED TO CHAIR: A LOT
TURNING FROM BACK TO SIDE WHILE IN FLAT BAD: A LITTLE
CLIMB 3 TO 5 STEPS WITH RAILING: A LOT

## 2025-07-15 ASSESSMENT — PATIENT HEALTH QUESTIONNAIRE - PHQ9
2. FEELING DOWN, DEPRESSED OR HOPELESS: NOT AT ALL
SUM OF ALL RESPONSES TO PHQ9 QUESTIONS 1 & 2: 0
1. LITTLE INTEREST OR PLEASURE IN DOING THINGS: NOT AT ALL

## 2025-07-15 ASSESSMENT — PAIN DESCRIPTION - LOCATION: LOCATION: BACK

## 2025-07-15 ASSESSMENT — PAIN SCALES - GENERAL
PAINLEVEL_OUTOF10: 0 - NO PAIN
PAINLEVEL_OUTOF10: 8
PAINLEVEL_OUTOF10: 0 - NO PAIN

## 2025-07-15 ASSESSMENT — PAIN - FUNCTIONAL ASSESSMENT
PAIN_FUNCTIONAL_ASSESSMENT: 0-10

## 2025-07-15 NOTE — CONSULTS
Infectious Disease Inpatient Consult    Inpatient consult to Infectious Diseases  Consult performed by: Pawan Bo MD  Consult ordered by: MARGO Torres-CNP            Primary MD: Yuval Kim MD    Reason For Consult  Bacteremia, antibiotic management      Assessment/Plan:    #Sepsis   #Proteus mirabilis bacteremia  #Right hydronephrosis s/p nephrostomy tube 7/14/2025  Zosyn switched to ceftriaxone pending susceptibilities.  Likely will switch to p.o. antibiotic on discharge    #Elevated troponin, likely type II  Likely due to sepsis.  Cardiology consulted and recommended no further management    HTN    Recommendations:    -DC Zosyn  -Start ceftriaxone 2 g once daily  -Pending susceptibilities  -Plan to switch to p.o. antibiotics on discharge  -Thank you for consult.  Will continue to follow    Complex antimicrobial therapy counseling.  Rationale for switching antibiotics discussed.  Education provided regarding long-term antimicrobial therapy as well as side effects.  Discussed with antimicrobial stewardship pharmacist that will influence antimicrobial therapy and the success of treatment for this infection.  Zosyn discontinued to minimize risk of MDR organisms    Pawan Bo MD  Date of service: 7/15/2025  Time of service: 11:32 AM      History Of Present Illness  Delia Mckeon is a 89 y.o. female with PMH of HTN presented to the hospital with complaint of nausea and vomiting.  As per son, she has slowly been getting weak and not eating much but then came to a point where she was not able to keep anything down and came to the hospital.  About a week ago, patient was prescribed Macrobid for UTI.    On admission, afebrile, HR 98, RR 18, /118.  Labs showed WBC count 16.9, Hb 12.6, platelet 94.3, potassium 3.4, creatinine 1.01, AST ALT normal, lactic acid 2.9.  Blood cultures were drawn and patient was started on Zosyn.    CT abdomen/pelvis was done which showed obstructing  "calculi in the distal right ureter resulting in moderate hydronephrosis and a focal perforation in the posterior wall of the mid ureter evidenced by extraluminal contrast.  Patient was taken to the IR and nephrostomy tube was placed.  Blood culture positive for Proteus mirabilis.  ID consulted for further antibiotic recommendation     Past Medical History  She has a past medical history of Hypertension.    Surgical History  She has no past surgical history on file.     Social History     Occupational History    Not on file   Tobacco Use    Smoking status: Never    Smokeless tobacco: Never   Substance and Sexual Activity    Alcohol use: Never    Drug use: Never    Sexual activity: Not on file     Travel History   Travel since 06/15/25    No documented travel since 06/15/25              Family History  Family History[1]  Allergies  Bisphosphonates     Immunization History   Administered Date(s) Administered    Moderna SARS-CoV-2 Vaccination 02/16/2021, 03/16/2021     Medications  Home medications:  Prescriptions Prior to Admission[2]  Current medications:  Scheduled medications  Scheduled Medications[3]  Continuous medications  Continuous Medications[4]  PRN medications  PRN Medications[5]    Review of Systems     Constitutional: Reports fatigue  HENT:  Denies ear discharge, ear pain, sore throat    Eyes:  Denies photophobia, eye drainage  Respiratory:  Denies cough, choking, chest tightness, shortness of breath  Cardiovascular:  Denies chest pain, palpitations  Gastrointestinal: Reports nausea, vomiting  Musculoskeletal:  Denies joint pain  Skin:  Denies ulcer and rash   Neurological:  Denies light-headedness, numbness and headaches.    Objective  Range of Vitals (last 24 hours)  Heart Rate:  [67-86]   Temp:  [36.1 °C (96.9 °F)-36.8 °C (98.2 °F)]   Resp:  [14-18]   BP: ()/(39-84)   Height:  [154.9 cm (5' 0.98\")]   Weight:  [72.1 kg (158 lb 15.2 oz)-72.7 kg (160 lb 4.4 oz)]   SpO2:  [85 %-99 %]   Daily " "Weight  07/15/25 : 72.7 kg (160 lb 4.4 oz)    Body mass index is 30.3 kg/m².     Physical Exam  /62 (BP Location: Right arm, Patient Position: Lying)   Pulse 79   Temp 36.1 °C (97 °F)   Resp 18   Ht (!) 1.549 m (5' 0.98\")   Wt 72.7 kg (160 lb 4.4 oz)   SpO2 95%   BMI 30.30 kg/m²   Temp (24hrs), Av.4 °C (97.5 °F), Min:36.1 °C (96.9 °F), Max:36.8 °C (98.2 °F)      General: Somewhat lethargic  HEENT: No conjunctival pallor, no scleral icterus  Lungs: bilaterally clear to auscultation, no wheezing  Heart: regular rate and rhythm, no murmur  Abdomen: soft, non tender, R nephrostomy tube  Neuro: AAO x 3, follow commands  Skin: No rashes or ulcers  MSK: No joint inflammation     Relevant Results    Labs  Results from last 72 hours   Lab Units 07/15/25  0438 25  1444   WBC AUTO x10*3/uL 16.8* 16.9*   HEMOGLOBIN g/dL 10.1* 12.6   HEMATOCRIT % 29.8* 35.5*   PLATELETS AUTO x10*3/uL 371 398   NEUTROS PCT AUTO %  --  94.3   LYMPHS PCT AUTO %  --  3.0   MONOS PCT AUTO %  --  1.1   EOS PCT AUTO %  --  0.1     Results from last 72 hours   Lab Units 07/15/25  0438 25  1444   SODIUM mmol/L 131* 130*   POTASSIUM mmol/L 2.7* 3.4*   CHLORIDE mmol/L 97* 92*   CO2 mmol/L 25 26   BUN mg/dL 23 18   CREATININE mg/dL 1.03 1.01   GLUCOSE mg/dL 164* 169*   CALCIUM mg/dL 9.7 10.9*   ANION GAP mmol/L 12 15   EGFR mL/min/1.73m*2 47* 48*     Results from last 72 hours   Lab Units 25  1444   ALK PHOS U/L 65   BILIRUBIN TOTAL mg/dL 1.1   BILIRUBIN DIRECT mg/dL 0.3   PROTEIN TOTAL g/dL 6.9   ALT U/L 11   AST U/L 15   ALBUMIN g/dL 3.5     Estimated Creatinine Clearance: 23.2 mL/min (by C-G formula based on SCr of 1.03 mg/dL).  No results found for: \"CRP\", \"SEDRATE\"  No results found for: \"HIV1X2\", \"HIVCONF\", \"FUYFLI6MW\"  No results found for: \"HEPCABINIT\", \"HEPCAB\", \"HCVPCRQUANT\"    Microbiology  Susceptibility data from last 14 days.  Collected Specimen Info Organism Amikacin Ampicillin Cefazolin Ciprofloxacin " Gentamicin Levofloxacin Piperacillin/Tazobactam Tetracycline Trimethoprim/Sulfamethoxazole   07/13/25 Blood culture from Peripheral Venipuncture Proteus mirabilis            07/13/25 Blood culture from Peripheral Venipuncture Proteus mirabilis  S  S  S  S  S  S  S  R  S       Imaging  IR placement of nephrostomy catheter  Result Date: 7/14/2025  1.  Uncomplicated and technically successful  8-Maldivian percutaneous nephrostomy tube placement -  right kidney.  The catheter was connected to external gravity drainage.   Performed and dictated at [facility].   MACRO: None   Signed by: Lavell Larry 7/14/2025 3:04 PM Dictation workstation:   [id number]    CT abdomen pelvis w IV contrast  Result Date: 7/13/2025  1. Obstructing calculi in the distal right ureter resulting in moderate hydronephrosis and a focal perforation the posterior wall of the mid ureter evidenced by increase in quantity of hyperdense fluid and extraluminal contrast adjacent to the may ureter as described below and above.   1 cm x 0.7 cm calculus obstructing the distal right ureter and several small calculi slightly distal to this. There is moderate hydroureteronephrosis and delayed nephrogram. There is diffuse thickening of the ureteral wall with hyperenhancement. There is loss of definition of the posterior wall of the right mid ureter adjacent to significant amount of periureteral fluid (axial image 76, series 2). On multiple subsequent delayed images there is increased quantity of fluid surrounding this portion of the ureter that is of high density and layering contrast can be seen on the 1st delayed phase image obtained beyond the wall of the ureter (axial image 80, series 7) consistent with a focal perforation in the posterior wall.   2. Cholelithiasis without evidence of acute cholecystitis.     MACRO: Nicolas Swift discussed the significance and urgency of this critical finding by EPIC secure chat with   Marcellus on 7/13/2025 at 6:01 pm.  (**-RCF-**) Findings:  See findings.   Signed by: Nicolas Swift 7/13/2025 6:02 PM Dictation workstation:   [id number]    XR chest 1 view  Result Date: 7/13/2025  1. Cardiomegaly with perihilar vascular congestion and small bilateral pleural effusion. Findings could be related to   Signed by: Gilberto Eng 7/13/2025 4:21 PM Dictation workstation:   [id number]             [1] No family history on file.  [2]   Medications Prior to Admission   Medication Sig Dispense Refill Last Dose/Taking    benazepriL-hydrochlorothiazide (Lotensin HCT) 20-12.5 mg tablet Take 1 tablet by mouth once daily.       nitrofurantoin (Macrodantin) 100 mg capsule Take 100 capsules (10,000 mg) by mouth 2 times a day.       propranolol (Inderal) 80 mg tablet Take 1 tablet (80 mg) by mouth 2 times a day.       verapamil SR (Calan-SR) 240 mg ER tablet Take 1 tablet (240 mg) by mouth once daily. Do not crush or chew.      [3] cefTRIAXone, 2 g, intravenous, q24h  enoxaparin, 30 mg, subcutaneous, Daily  perflutren lipid microspheres, 0.5-10 mL of dilution, intravenous, Once in imaging  potassium chloride, 20 mEq, intravenous, q2h    [4]    [5] PRN medications: morphine, morphine, ondansetron

## 2025-07-15 NOTE — PROGRESS NOTES
"[facility] MEDICINE PROGRESS NOTE    Subjective     Son at bedside says pt has had no appetite in days, usually eats well. Pt says she'd not hungry. She denies nausea, emesis and abdominal pain.    Objective     I personally reviewed all pertinent labwork, imaging and vital signs, as well as nursing, therapy, discharge planning and consult notes.     Visit Vitals  /84   Pulse 71   Temp 36.1 °C (96.9 °F) (Temporal)   Resp 17   Ht (!) 1.549 m (5' 0.98\")   Wt 72.7 kg (160 lb 4.4 oz)   SpO2 98%   BMI 30.30 kg/m²   OB Status Postmenopausal   Smoking Status Never   BSA 1.77 m²       Vitals:    07/15/25 0646   Weight: 72.7 kg (160 lb 4.4 oz)       Scheduled medications  Scheduled Medications[1]  Continuous medications  Continuous Medications[2]  PRN medications  PRN Medications[3]    The following labwork was reviewed:  Results for orders placed or performed during the hospital encounter of 07/13/25 (from the past 24 hours)   Urinalysis with Reflex Culture and Microscopic   Result Value Ref Range    Color, Urine Orange (N) Light-Yellow, Yellow, Dark-Yellow    Appearance, Urine Ex.Turbid (N) Clear    Specific Gravity, Urine >1.050 (N) 1.005 - 1.035    pH, Urine 6.5 5.0, 5.5, 6.0, 6.5, 7.0, 7.5, 8.0    Protein, Urine 70 (1+) (A) NEGATIVE, 10 (TRACE), 20 (TRACE) mg/dL    Glucose, Urine Normal Normal mg/dL    Blood, Urine OVER (3+) (A) NEGATIVE mg/dL    Ketones, Urine NEGATIVE NEGATIVE mg/dL    Bilirubin, Urine NEGATIVE NEGATIVE mg/dL    Urobilinogen, Urine Normal Normal mg/dL    Nitrite, Urine NEGATIVE NEGATIVE    Leukocyte Esterase, Urine 500 Zully/uL (A) NEGATIVE   Extra Urine Gray Tube   Result Value Ref Range    Extra Tube     Microscopic Only, Urine   Result Value Ref Range    WBC, Urine >50 (A) 1-5, NONE /HPF    WBC Clumps, Urine MANY Reference range not established. /HPF    RBC, Urine >20 (A) NONE, 1-2, 3-5 /HPF    Bacteria, Urine 4+ (A) NONE SEEN /HPF   Basic Metabolic Panel   Result Value Ref Range    " Glucose 164 (H) 74 - 99 mg/dL    Sodium 131 (L) 136 - 145 mmol/L    Potassium 2.7 (LL) 3.5 - 5.3 mmol/L    Chloride 97 (L) 98 - 107 mmol/L    Bicarbonate 25 21 - 32 mmol/L    Anion Gap 12 10 - 20 mmol/L    Urea Nitrogen 23 6 - 23 mg/dL    Creatinine 1.03 0.50 - 1.05 mg/dL    eGFR 47 (L) >60 mL/min/1.73m*2    Calcium 9.7 8.6 - 10.3 mg/dL   CBC   Result Value Ref Range    WBC 16.8 (H) 4.4 - 11.3 x10*3/uL    nRBC 0.0 0.0 - 0.0 /100 WBCs    RBC 3.17 (L) 4.00 - 5.20 x10*6/uL    Hemoglobin 10.1 (L) 12.0 - 16.0 g/dL    Hematocrit 29.8 (L) 36.0 - 46.0 %    MCV 94 80 - 100 fL    MCH 31.9 26.0 - 34.0 pg    MCHC 33.9 32.0 - 36.0 g/dL    RDW 12.6 11.5 - 14.5 %    Platelets 371 150 - 450 x10*3/uL   Magnesium   Result Value Ref Range    Magnesium 1.72 1.60 - 2.40 mg/dL   Transthoracic Echo Complete   Result Value Ref Range    BSA 1.77 m2       The following imaging was reviewed:  IR placement of nephrostomy catheter  Result Date: 7/14/2025  1.  Uncomplicated and technically successful  8-Nepali percutaneous nephrostomy tube placement -  right kidney.  The catheter was connected to external gravity drainage.   Performed and dictated at [facility].   MACRO: None   Signed by: Lavell Larry 7/14/2025 3:04 PM Dictation workstation:   [id number]    CT abdomen pelvis w IV contrast  Result Date: 7/13/2025  1. Obstructing calculi in the distal right ureter resulting in moderate hydronephrosis and a focal perforation the posterior wall of the mid ureter evidenced by increase in quantity of hyperdense fluid and extraluminal contrast adjacent to the may ureter as described below and above.   1 cm x 0.7 cm calculus obstructing the distal right ureter and several small calculi slightly distal to this. There is moderate hydroureteronephrosis and delayed nephrogram. There is diffuse thickening of the ureteral wall with hyperenhancement. There is loss of definition of the posterior wall of the right mid ureter  adjacent to significant amount of periureteral fluid (axial image 76, series 2). On multiple subsequent delayed images there is increased quantity of fluid surrounding this portion of the ureter that is of high density and layering contrast can be seen on the 1st delayed phase image obtained beyond the wall of the ureter (axial image 80, series 7) consistent with a focal perforation in the posterior wall.   2. Cholelithiasis without evidence of acute cholecystitis.     MACRO: Nicolas Swift discussed the significance and urgency of this critical finding by EPIC secure chat with Dr. Germain on 7/13/2025 at 6:01 pm.  (**-RCF-**) Findings:  See findings.   Signed by: Nicolas Swift 7/13/2025 6:02 PM Dictation workstation:   [id number]    XR chest 1 view  Result Date: 7/13/2025  1. Cardiomegaly with perihilar vascular congestion and small bilateral pleural effusion. Findings could be related to   Signed by: Gilberto Eng 7/13/2025 4:21 PM Dictation workstation:   [id number]      Constitutional: Well developed, sleepy, oriented x4, no acute distress, cooperative   Eyes: EOMI, clear sclerae   ENMT: mucous membranes moist, no lesions seen   Head/Neck: Neck supple, no apparent injury, head atraumatic   Respiratory/Thorax: CTAB anteriorly, good chest expansion, respirations even and unlabored, pt on 2L O2   Cardiovascular: Regular rate and rhythm, no murmurs/rubs/gallops, normal S1 and S 2   Gastrointestinal: Abdomen nondistended, soft, nontender, +BS, no bruits   Musculoskeletal: ROM intact, no joint swelling, normal  strength   Extremities: no cyanosis, contusions or clubbing, or edema   Neurological: no focal deficit, pt alert and oriented x4   Psychological: Appropriate affect and behavior, pleasant   Skin: Warm and dry, no lesions, no rashes     Medical History[4]    Assessment/Plan     Obstructive right distal ureter stone with associated hydroureteronephrosis   Proteus bacteremia  Switch Zosyn to cefepime,  urine cx is negative, will consult ID for discharge Abx recs  Pt is s/p percutaneous nephrostomy placement yesterday, no ureteral wall perforation was seen     NSTEMI  Heparin gtt was deferred on admission given possible ureteral perforation and need for surgical intervention  Cardiology recommending no further intervention, echocardiogram ordered      Anorexia  Likely sequela of infection, will advance CLD but pt is not hungry, consult dietician    Hyperglycemia  No hx of diabetes, A1c was 5.5 last November     Mild hyponatremia  Stable today, not much data in the EMR     Hypotension, resolved with IVF today  Continue to hold home BP meds for now as BP is overall controlled     DVT ppx: Lovenox     Discharge disposition  Discharge when medically stable and pending PT/OT noreen Stallings, CNP  [facility] Medicine         [1] enoxaparin, 30 mg, subcutaneous, Daily  perflutren lipid microspheres, 0.5-10 mL of dilution, intravenous, Once in imaging  piperacillin-tazobactam, 3.375 g, intravenous, q6h  potassium chloride, 20 mEq, intravenous, q2h  [2] dextrose 5%-0.45 % sodium chloride, 75 mL/hr, Last Rate: 75 mL/hr (07/15/25 0143)  [3] PRN medications: morphine, morphine, ondansetron  [4]   Past Medical History:  Diagnosis Date    Hypertension       No

## 2025-07-15 NOTE — PROGRESS NOTES
Occupational Therapy    Evaluation    Patient Name: Delia Mckeon  MRN: [id number]  Department: [address]  Room: 2021/2021-A  Today's Date: 7/15/2025  Time Calculation  Start Time: 0852  Stop Time: 0912  Time Calculation (min): 20 min    Assessment  IP OT Assessment  OT Assessment: pt demonstrates increased need for assistance for functionl transfers and functional mobilty at thiss time. Pt will benefit from skilled OT to address impairments in function.  Prognosis: Good  Barriers to Discharge Home: Physical needs  Physical Needs: 24hr mobility assistance needed, 24hr ADL assistance needed, High falls risk due to function or environment  Evaluation/Treatment Tolerance: Patient limited by fatigue  Medical Staff Made Aware: Yes  End of Session Communication: Bedside nurse  End of Session Patient Position: Bed, 3 rail up, Alarm on  Plan:  Treatment Interventions: ADL retraining, Functional transfer training, Endurance training, Equipment evaluation/education, Patient/family training, Compensatory technique education  OT Frequency: 4 times per week  OT Discharge Recommendations: Moderate intensity level of continued care (pending family assist available)  OT Recommended Transfer Status: Assist of 2  OT - OK to Discharge: Yes    Subjective   Current Problem:  1. Sepsis secondary to UTI (Multi)        2. Ureter perforation        3. Elevated troponin        4. Ureteral stone        5. NSTEMI (non-ST elevated myocardial infarction) (Multi)  Transthoracic Echo Complete    Transthoracic Echo Complete        OT Visit Info:  OT Received On: 07/15/25  General Visit Info:  General  Reason for Referral: ADLs  Referred By: Ric  Past Medical History Relevant to Rehab: PMHx of HTN who presented with nausea and vomiting. She was treated with antibiotics a week ago for a UTI but over the last few days PTA she had been vomiting  Family/Caregiver Present: Yes  Caregiver Feedback: maribeth present and able to assist in PLOF and home  setup  Co-Treatment: PT  Co-Treatment Reason: For increased safety with mobility  Prior to Session Communication: Bedside nurse  Patient Position Received: Bed, 3 rail up, Alarm on  General Comment: Pt seen in 2021 with son at bedside, appears fatigue but willing to participate in therapy at this time. Pt son reports she just finished getting cleaned up so she was a little tired.  Precautions:  Hearing/Visual Limitations: [place]  Medical Precautions: Fall precautions  Precautions Comment: R nephrostomy drain in place and secured during session     Date/Time Vitals Session Patient Position Pulse Resp SpO2 BP MAP (mmHg)    07/15/25 1110 --  --  79  18  95 %  111/62  79                Pain:  Pain Assessment  Pain Assessment: 0-10  0-10 (Numeric) Pain Score: 0 - No pain (reports no pain but demonstrated increased pain in R side near drain with transitions)    Objective   Cognition:  Overall Cognitive Status: Within Functional Limits  Orientation Level: Disoriented to time  Insight: Mild  Impulsive: Within functional limits  Processing Speed: Delayed           Home Living:  Home Living Comments: Pt son reports pt lives with son in 2 level home but stays on 1st floor. tub shower but pt does not utilize anymore due to difficuclty transfering so she sponge bathes. 3 stairs to enter with railing (son reoprts looking into [facility] for bathing assist)   Prior Function:  Prior Function Comments: Pt receives assistance in all ADLs and IADLs, pt son reports he assists with all transfers and functional mobility arm in arm without device at home. pt has 24/7 assist     ADL:  Eating Assistance: Minimal  Grooming Assistance: Moderate  Bathing Assistance: Moderate  UE Dressing Assistance: Maximal  LE Dressing Assistance: Maximal  Toileting Assistance with Device: Maximal  Functional Assistance: Moderate  Activity Tolerance:  Endurance: Decreased tolerance for upright activites  Activity Tolerance Comments: fatigue after min OOB activity  Bed  Mobility/Transfers: Bed Mobility  Bed Mobility: Yes  Bed Mobility 1  Bed Mobility 1: Supine to sitting, Sitting to supine  Level of Assistance 1: Moderate assistance, +2    Transfers  Transfer: Yes  Transfer 1  Technique 1: Sit to stand, Stand to sit  Transfer Device 1: Walker  Transfer Level of Assistance 1: Moderate assistance, +2  Trials/Comments 1: to from EOB      Functional Mobility:  Functional Mobility  Functional Mobility Performed: Yes  Functional Mobility 1  Surface 1: Level tile  Device 1: Rolling walker  Assistance 1: Moderate assistance (x2)  Comments 1: side steps to HOB, incrased time to complete mild SOB  Sitting Balance:  Static Sitting Balance  Static Sitting-Balance Support: Bilateral upper extremity supported  Static Sitting-Level of Assistance: Minimum assistance  Static Sitting-Comment/Number of Minutes: fluctuating min / mod L lateral lean  Standing Balance:  Static Standing Balance  Static Standing-Balance Support: Bilateral upper extremity supported  Static Standing-Level of Assistance: Moderate assistance  Modalities:     Vision: Vision - Basic Assessment  Current Vision: No visual deficits  Sensation:  Sensation Comment: denies numbness/tingling  Strength:  Strength Comments: BUEs grossly 3+/5     Coordination:  Movements are Fluid and Coordinated: Yes   Hand Function:  Hand Function  Gross Grasp: Functional  Coordination: Functional      Outcome Measures: [facility] Daily Activity  Putting on and taking off regular lower body clothing: A lot  Bathing (including washing, rinsing, drying): A lot  Putting on and taking off regular upper body clothing: A lot  Toileting, which includes using toilet, bedpan or urinal: A lot  Taking care of personal grooming such as brushing teeth: A little  Eating Meals: A little  Daily Activity - Total Score: 14      Education Documentation  Body Mechanics, taught by Kim Pritchard OT at 7/15/2025 12:30 PM.  Learner: Family, Patient  Readiness:  Acceptance  Method: Explanation  Response: Verbalizes Understanding  Comment: transfer training    Precautions, taught by Kim Pritchard OT at 7/15/2025 12:30 PM.  Learner: Family, Patient  Readiness: Acceptance  Method: Explanation  Response: Verbalizes Understanding  Comment: transfer training    Education Comments  No comments found.      Goals:   Encounter Problems       Encounter Problems (Active)       BALANCE       Pt will maintain dynamic sitting balance during ADL task with stand by assist level of assistance in order to demonstrate decreased risk of falling and improved postural control. (Progressing)       Start:  07/15/25    Expected End:  07/29/25               MOBILITY       Patient will perform Functional mobility  Household distances/Community Distances with minimal assist  level of assistance and least restrictive device in order to improve safety and functional mobility. (Progressing)       Start:  07/15/25    Expected End:  07/29/25               TRANSFERS       Patient will complete functional transfers with least restrictive device with minimal assist  level of assistance. (Progressing)       Start:  07/15/25    Expected End:  07/29/25

## 2025-07-15 NOTE — PROGRESS NOTES
Social work consult placed for positive medical risk screen. SW reviewed pt's chart and communicated with TCC. No SW needs foreseen at this time. SW signing off; available upon request.    ANUPAM Jackson, LSW ([id number])   Care Transitions

## 2025-07-15 NOTE — PROGRESS NOTES
07/15/25 1138   Discharge Planning   Living Arrangements Children   Support Systems Children   Assistance Needed 1 Assist   Type of Residence Private residence   Home or Post Acute Services In home services   Type of Home Care Services Home nursing visits;Home OT;Home PT   Expected Discharge Disposition Home H   Does the patient need discharge transport arranged? Yes   [place] [facility] coordination needed? Yes   Has discharge transport been arranged? No   Financial Resource Strain   How hard is it for you to pay for the very basics like food, housing, medical care, and heating? Not hard   Housing Stability   In the last 12 months, was there a time when you were not able to pay the mortgage or rent on time? N   At any time in the past 12 months, were you homeless or living in a shelter (including now)? N   Transportation Needs   In the past 12 months, has lack of transportation kept you from medical appointments or from getting medications? no   In the past 12 months, has lack of transportation kept you from meetings, work, or from getting things needed for daily living? No     PCP is Yuval Kim MD. Patient is from home with her son. Spoke to patient and son at bedside. Patient is 1 assist with ambulation. Her son assists with sponge bathing, cooking cleaning, and transportation. PT/ OT pending. Patients son is open to HHC or SNF placement if recommended. He denies any other home going needs at this time. TCC will continue to follow for needs if they arise.        07/15/25 1258   Patient Choice   Provider Choice list and CMS website (https://medicare.gov/care-compare#search) for post-acute Quality and Resource Measure Data were provided and reviewed with: Family   Patient / Family choosing to utilize agency / facility established prior to hospitalization No     Patient has been recommended for Skilled Nursing Facility. Provided skilled nursing list and HHC list to patients son from [facility] directory  that includes facilities that are within  Post - Acute Quality Network, as well as meeting patient's medical needs, and are in-network for patient's insurance; while also in discharge geographic area patient prefers, and identifies each facilities CMS star rating. Patients son prefers for patient to return home, uncertain of HHC. Son states that she is almost at baseline and he feels very comfortable with her returning home. Son to review list for HHC choices. TCC to follow.

## 2025-07-15 NOTE — CARE PLAN
Problem: Pain - Adult  Goal: Verbalizes/displays adequate comfort level or baseline comfort level  Outcome: Progressing     Problem: Safety - Adult  Goal: Free from fall injury  Outcome: Progressing     Problem: Discharge Planning  Goal: Discharge to home or other facility with appropriate resources  Outcome: Progressing     Problem: Chronic Conditions and Co-morbidities  Goal: Patient's chronic conditions and co-morbidity symptoms are monitored and maintained or improved  Outcome: Progressing     Problem: Nutrition  Goal: Nutrient intake appropriate for maintaining nutritional needs  Outcome: Progressing     Problem: Skin  Goal: Decreased wound size/increased tissue granulation at next dressing change  Outcome: Progressing  Goal: Participates in plan/prevention/treatment measures  Outcome: Progressing  Goal: Prevent/manage excess moisture  Outcome: Progressing  Goal: Prevent/minimize sheer/friction injuries  Outcome: Progressing  Flowsheets (Taken 7/15/2025 0214)  Prevent/minimize sheer/friction injuries: Use pull sheet  Goal: Promote/optimize nutrition  Outcome: Progressing  Goal: Promote skin healing  Outcome: Progressing

## 2025-07-15 NOTE — PROGRESS NOTES
Physical Therapy    Physical Therapy Evaluation    Patient Name: Delia Mckeon  MRN: [id number]  Today's Date: 7/15/2025   Time Calculation  Start Time: 0851  Stop Time: 0908  Time Calculation (min): 17 min  2021/2021-A    Assessment/Plan   PT Assessment  PT Assessment Results: Decreased strength, Decreased endurance, Impaired balance, Decreased mobility, Impaired hearing  Rehab Prognosis: Fair  Barriers to Discharge Home: Physical needs  Physical Needs: Stair navigation into home limited by function/safety, In-home setup navigation limited by function/safety, Ambulating household distances limited by function/safety, 24hr mobility assistance needed, 24hr ADL assistance needed, High falls risk due to function or environment  Evaluation/Treatment Tolerance: Patient limited by fatigue  Medical Staff Made Aware: Yes  End of Session Communication: Bedside nurse  Assessment Comment: Patient mod A x 2 for all mobility this session. Patient lethargic from this morning upon arrival to session. Patient would benefit from continued PT to improve strength, functional mobility, and return to PLOF. Rec is MOD pending patient progress.  End of Session Patient Position: Bed, 3 rail up, Alarm on  IP OR SWING BED PT PLAN  Inpatient or Swing Bed: Inpatient  PT Plan  Treatment/Interventions: Bed mobility, Transfer training, Gait training, Stair training, Balance training, Neuromuscular re-education, Strengthening, Endurance training, Therapeutic exercise, Therapeutic activity, Home exercise program  PT Plan: Ongoing PT  PT Frequency: 4 times per week  PT Discharge Recommendations: Moderate intensity level of continued care (pending patient progress)  Based on current functional status and rehab potential, patient is anticipated to tolerate and benefit from 5 or more days per week of skilled rehabilitative therapy after discharge from this acute inpatient hospitalization.  Equipment Recommended upon Discharge:  (TBD)  PT Recommended  Transfer Status: Assist x2  PT - OK to Discharge: Yes (When medically cleared)        General Visit Information:  General  Reason for Referral: Impaired mobility, nausea, vomiting, altered mental status  Referred By: Ric  Past Medical History Relevant to Rehab: PMHx of HTN who presented with nausea and vomiting. She was treated with antibiotics a week ago for a UTI but over the last few days PTA she had been vomiting  Family/Caregiver Present: Yes (Son)  Co-Treatment: OT  Co-Treatment Reason: For increased safety with mobility  Prior to Session Communication: Bedside nurse  Patient Position Received: Bed, 3 rail up, Alarm on  General Comment: Room 2021. Patient agreeable to therapy. Son in room, helped with subjective information about home and PLOF    Home Living:  Home Living  Home Living Comments: Per patient and patient son, she lives with son, with 24/7 family assist and stays on the main level. Has 3 steps with railing to enter. Has tub shower at home, has been sponge bathing    Prior Level of Function:  Prior Function Per Pt/Caregiver Report  Prior Function Comments: Per patient and patient's son, she receives help for ADLS and family complete IADLs. Patient assisted with transfers and mobility from son. Does not use AD at home. Patient sponge bathes. Family drives    Precautions:  Precautions  Hearing/Visual Limitations: Yocha Dehe  Medical Precautions: Fall precautions  Precautions Comment: R drain in place    Vital Signs:     Objective     Pain:  Pain Assessment  0-10 (Numeric) Pain Score: 0 - No pain    Cognition:  Cognition  Overall Cognitive Status: Within Functional Limits  Orientation Level: Disoriented to time  Attention: Within Functional Limits  Insight: Mild  Impulsive: Within functional limits  Processing Speed: Delayed    General Assessments:      Activity Tolerance  Endurance: Decreased tolerance for upright activites  Activity Tolerance Comments: Decreased tolerance for activity today, patient  tired  Sensation  Sensation Comment: denies numbness/tingling  Strength  Strength Comments: BLEs grossly 3+/5        Postural Control  Posture Comment: Mild flexed posture with standing and side steps  Static Sitting Balance  Static Sitting-Comment/Number of Minutes: Fair EOB min A to mod A to maintain sitting; 4 min  Dynamic Sitting Balance  Dynamic Sitting-Comments: Fair-  Static Standing Balance  Static Standing-Comment/Number of Minutes: Fair- mod A to maintain HHA  Dynamic Standing Balance  Dynamic Standing-Comments: Fair-    Functional Assessments:     Bed Mobility  Bed Mobility:  (Supine to sit w/HOB elevated mod A x 2 with cueing for hand placement, safety, and sequencing. Dependent scoot up in bed with use of pads.)  Transfers  Transfer:  (Sit to stand from EOB mod A x 2 with cueing for hand placement and safety. Patient mild fatigue with sit to stand)  Ambulation/Gait Training  Ambulation/Gait Training Performed:  (AMB 3ft toward HOB mod A x 2 with HHA and cueing for safety with sequencing steps. Patient mild fatigue with AMB.)  Stairs  Stairs: No       Extremity/Trunk Assessments:                Outcome Measures:     [facility] Basic Mobility  Turning from your back to your side while in a flat bed without using bedrails: A lot  Moving from lying on your back to sitting on the side of a flat bed without using bedrails: A lot  Moving to and from bed to chair (including a wheelchair): A lot  Standing up from a chair using your arms (e.g. wheelchair or bedside chair): A lot  To walk in hospital room: A lot  Climbing 3-5 steps with railing: Total  Basic Mobility - Total Score: 11                                                             Goals:  Encounter Problems       Encounter Problems (Active)       PT Problem       Transfers       Start:  07/15/25    Expected End:  07/29/25       Patient will perform all transfers with min A x 1 for increased safety           Gait       Start:  07/15/25    Expected End:   07/29/25       Patient will amb 40+ feet with min A x 1           Strengthening       Start:  07/15/25    Expected End:  07/29/25       Patient will perform 20+ reps of AROM for LINDA LE's to improve safety and functional independence                 Education Documentation  Precautions, taught by ANAYELI Mercado at 7/15/2025 12:14 PM.  Learner: Patient  Readiness: Acceptance  Method: Explanation  Response: Needs Reinforcement  Comment: For increased safety    Mobility Training, taught by ANAYELI Mercado at 7/15/2025 12:14 PM.  Learner: Patient  Readiness: Acceptance  Method: Explanation  Response: Needs Reinforcement  Comment: For increased safety    Education Comments  No comments found.        Completion of this session, clinical decision making, and documentation performed under the supervision/direction of Adrienne Valle.      ANAYELI MERCADO

## 2025-07-15 NOTE — CARE PLAN
The patient's goals for the shift include    Problem: Pain - Adult  Goal: Verbalizes/displays adequate comfort level or baseline comfort level  7/15/2025 1106 by Patricia Fisher RN  Outcome: Progressing  7/15/2025 1106 by Patricia Fisher RN  Flowsheets (Taken 7/15/2025 1106)  Verbalizes/displays adequate comfort level or baseline comfort level:   Encourage patient to monitor pain and request assistance   Assess pain using appropriate pain scale     Problem: Safety - Adult  Goal: Free from fall injury  Outcome: Progressing  Flowsheets (Taken 7/15/2025 1106)  Free from fall injury: Instruct family/caregiver on patient safety     Problem: Discharge Planning  Goal: Discharge to home or other facility with appropriate resources  Outcome: Progressing     Problem: Chronic Conditions and Co-morbidities  Goal: Patient's chronic conditions and co-morbidity symptoms are monitored and maintained or improved  Outcome: Progressing     Problem: Nutrition  Goal: Nutrient intake appropriate for maintaining nutritional needs  Outcome: Progressing     Problem: Skin  Goal: Decreased wound size/increased tissue granulation at next dressing change  Outcome: Progressing  Goal: Participates in plan/prevention/treatment measures  Outcome: Progressing  Flowsheets (Taken 7/15/2025 1106)  Participates in plan/prevention/treatment measures:   Elevate heels   Increase activity/out of bed for meals  Goal: Prevent/manage excess moisture  Outcome: Progressing  Flowsheets (Taken 7/15/2025 1106)  Prevent/manage excess moisture: Monitor for/manage infection if present  Goal: Prevent/minimize sheer/friction injuries  Outcome: Progressing  Flowsheets (Taken 7/15/2025 1106)  Prevent/minimize sheer/friction injuries:   Use pull sheet   Turn/reposition every 2 hours/use positioning/transfer devices  Goal: Promote/optimize nutrition  Outcome: Progressing  Flowsheets (Taken 7/15/2025 1106)  Promote/optimize nutrition:   Monitor/record intake including  meals   Consume > 50% meals/supplements  Goal: Promote skin healing  Outcome: Progressing       The clinical goals for the shift include remain hemodynamically stable this shift.    See assessment and mar. Weaned to room air. Morning labs ordered. Son at bedside. Potassium replaced this morning. Tele as ordered.

## 2025-07-16 LAB
ANION GAP SERPL CALC-SCNC: 9 MMOL/L (ref 10–20)
BACTERIA BLD AEROBE CULT: ABNORMAL
BACTERIA BLD AEROBE CULT: ABNORMAL
BACTERIA BLD CULT: ABNORMAL
BACTERIA BLD CULT: ABNORMAL
BUN SERPL-MCNC: 20 MG/DL (ref 6–23)
CALCIUM SERPL-MCNC: 9.9 MG/DL (ref 8.6–10.3)
CHLORIDE SERPL-SCNC: 100 MMOL/L (ref 98–107)
CO2 SERPL-SCNC: 27 MMOL/L (ref 21–32)
CREAT SERPL-MCNC: 0.81 MG/DL (ref 0.5–1.05)
EGFRCR SERPLBLD CKD-EPI 2021: 63 ML/MIN/1.73M*2
ERYTHROCYTE [DISTWIDTH] IN BLOOD BY AUTOMATED COUNT: 12.5 % (ref 11.5–14.5)
GLUCOSE SERPL-MCNC: 130 MG/DL (ref 74–99)
GRAM STN SPEC: ABNORMAL
HCT VFR BLD AUTO: 30.9 % (ref 36–46)
HGB BLD-MCNC: 10.8 G/DL (ref 12–16)
MAGNESIUM SERPL-MCNC: 1.58 MG/DL (ref 1.6–2.4)
MCH RBC QN AUTO: 32 PG (ref 26–34)
MCHC RBC AUTO-ENTMCNC: 35 G/DL (ref 32–36)
MCV RBC AUTO: 91 FL (ref 80–100)
NRBC BLD-RTO: 0 /100 WBCS (ref 0–0)
PLATELET # BLD AUTO: 397 X10*3/UL (ref 150–450)
POTASSIUM SERPL-SCNC: 3.6 MMOL/L (ref 3.5–5.3)
RBC # BLD AUTO: 3.38 X10*6/UL (ref 4–5.2)
SODIUM SERPL-SCNC: 132 MMOL/L (ref 136–145)
WBC # BLD AUTO: 13.4 X10*3/UL (ref 4.4–11.3)

## 2025-07-16 PROCEDURE — 2500000004 HC RX 250 GENERAL PHARMACY W/ HCPCS (ALT 636 FOR OP/ED): Performed by: INTERNAL MEDICINE

## 2025-07-16 PROCEDURE — 83735 ASSAY OF MAGNESIUM: CPT | Performed by: INTERNAL MEDICINE

## 2025-07-16 PROCEDURE — 85027 COMPLETE CBC AUTOMATED: CPT | Performed by: NURSE PRACTITIONER

## 2025-07-16 PROCEDURE — 80048 BASIC METABOLIC PNL TOTAL CA: CPT | Performed by: NURSE PRACTITIONER

## 2025-07-16 PROCEDURE — 97116 GAIT TRAINING THERAPY: CPT | Mod: CQ,GP

## 2025-07-16 PROCEDURE — 2500000004 HC RX 250 GENERAL PHARMACY W/ HCPCS (ALT 636 FOR OP/ED)

## 2025-07-16 PROCEDURE — 2500000004 HC RX 250 GENERAL PHARMACY W/ HCPCS (ALT 636 FOR OP/ED): Performed by: STUDENT IN AN ORGANIZED HEALTH CARE EDUCATION/TRAINING PROGRAM

## 2025-07-16 PROCEDURE — 2060000001 HC INTERMEDIATE ICU ROOM DAILY

## 2025-07-16 PROCEDURE — 36415 COLL VENOUS BLD VENIPUNCTURE: CPT | Performed by: NURSE PRACTITIONER

## 2025-07-16 PROCEDURE — 2500000001 HC RX 250 WO HCPCS SELF ADMINISTERED DRUGS (ALT 637 FOR MEDICARE OP): Performed by: INTERNAL MEDICINE

## 2025-07-16 PROCEDURE — 99232 SBSQ HOSP IP/OBS MODERATE 35: CPT | Performed by: NURSE PRACTITIONER

## 2025-07-16 PROCEDURE — 99233 SBSQ HOSP IP/OBS HIGH 50: CPT | Performed by: INTERNAL MEDICINE

## 2025-07-16 PROCEDURE — 97530 THERAPEUTIC ACTIVITIES: CPT | Mod: GO,CO

## 2025-07-16 RX ORDER — METOPROLOL TARTRATE 1 MG/ML
5 INJECTION, SOLUTION INTRAVENOUS ONCE
Status: COMPLETED | OUTPATIENT
Start: 2025-07-16 | End: 2025-07-16

## 2025-07-16 RX ORDER — CEFAZOLIN SODIUM 2 G/50ML
2 SOLUTION INTRAVENOUS EVERY 12 HOURS
Status: DISCONTINUED | OUTPATIENT
Start: 2025-07-17 | End: 2025-07-17

## 2025-07-16 RX ORDER — SODIUM CHLORIDE, SODIUM LACTATE, POTASSIUM CHLORIDE, CALCIUM CHLORIDE 600; 310; 30; 20 MG/100ML; MG/100ML; MG/100ML; MG/100ML
50 INJECTION, SOLUTION INTRAVENOUS CONTINUOUS
Status: ACTIVE | OUTPATIENT
Start: 2025-07-16 | End: 2025-07-17

## 2025-07-16 RX ORDER — METOPROLOL TARTRATE 25 MG/1
25 TABLET, FILM COATED ORAL 2 TIMES DAILY
Status: DISCONTINUED | OUTPATIENT
Start: 2025-07-16 | End: 2025-07-16

## 2025-07-16 RX ORDER — MAGNESIUM SULFATE HEPTAHYDRATE 40 MG/ML
2 INJECTION, SOLUTION INTRAVENOUS ONCE
Status: COMPLETED | OUTPATIENT
Start: 2025-07-16 | End: 2025-07-16

## 2025-07-16 RX ADMIN — ENOXAPARIN SODIUM 30 MG: 30 INJECTION SUBCUTANEOUS at 08:51

## 2025-07-16 RX ADMIN — MAGNESIUM SULFATE HEPTAHYDRATE 2 G: 40 INJECTION, SOLUTION INTRAVENOUS at 14:13

## 2025-07-16 RX ADMIN — SODIUM CHLORIDE 500 ML: 0.9 INJECTION, SOLUTION INTRAVENOUS at 05:34

## 2025-07-16 RX ADMIN — CEFTRIAXONE 2 G: 2 INJECTION, SOLUTION INTRAVENOUS at 08:52

## 2025-07-16 RX ADMIN — METOPROLOL TARTRATE 25 MG: 25 TABLET, FILM COATED ORAL at 11:02

## 2025-07-16 RX ADMIN — SODIUM CHLORIDE, SODIUM LACTATE, POTASSIUM CHLORIDE, AND CALCIUM CHLORIDE 50 ML/HR: .6; .31; .03; .02 INJECTION, SOLUTION INTRAVENOUS at 16:34

## 2025-07-16 RX ADMIN — METOROPROLOL TARTRATE 5 MG: 5 INJECTION, SOLUTION INTRAVENOUS at 05:33

## 2025-07-16 ASSESSMENT — COGNITIVE AND FUNCTIONAL STATUS - GENERAL
DRESSING REGULAR LOWER BODY CLOTHING: A LOT
TOILETING: A LOT
CLIMB 3 TO 5 STEPS WITH RAILING: TOTAL
DAILY ACTIVITIY SCORE: 14
WALKING IN HOSPITAL ROOM: A LOT
CLIMB 3 TO 5 STEPS WITH RAILING: TOTAL
HELP NEEDED FOR BATHING: A LOT
MOVING TO AND FROM BED TO CHAIR: A LOT
MOBILITY SCORE: 11
DRESSING REGULAR UPPER BODY CLOTHING: A LITTLE
STANDING UP FROM CHAIR USING ARMS: A LOT
EATING MEALS: A LITTLE
MOBILITY SCORE: 13
TURNING FROM BACK TO SIDE WHILE IN FLAT BAD: A LITTLE
TURNING FROM BACK TO SIDE WHILE IN FLAT BAD: A LOT
MOVING FROM LYING ON BACK TO SITTING ON SIDE OF FLAT BED WITH BEDRAILS: A LOT
PERSONAL GROOMING: A LITTLE
PERSONAL GROOMING: A LITTLE
EATING MEALS: A LITTLE
DRESSING REGULAR UPPER BODY CLOTHING: A LOT
WALKING IN HOSPITAL ROOM: A LOT
STANDING UP FROM CHAIR USING ARMS: A LOT
HELP NEEDED FOR BATHING: A LOT
TOILETING: A LOT
DAILY ACTIVITIY SCORE: 16
DRESSING REGULAR LOWER BODY CLOTHING: A LITTLE
MOVING FROM LYING ON BACK TO SITTING ON SIDE OF FLAT BED WITH BEDRAILS: A LITTLE
MOVING TO AND FROM BED TO CHAIR: A LOT

## 2025-07-16 ASSESSMENT — PAIN - FUNCTIONAL ASSESSMENT
PAIN_FUNCTIONAL_ASSESSMENT: 0-10
PAIN_FUNCTIONAL_ASSESSMENT: 0-10

## 2025-07-16 ASSESSMENT — ENCOUNTER SYMPTOMS
DYSPNEA ON EXERTION: 1
GASTROINTESTINAL NEGATIVE: 1
MEMORY LOSS: 0
FOCAL WEAKNESS: 0
LIGHT-HEADEDNESS: 0
FALLS: 0
COUGH: 0
DECREASED APPETITE: 0
DEPRESSION: 0
SHORTNESS OF BREATH: 0
BLURRED VISION: 0

## 2025-07-16 ASSESSMENT — PAIN SCALES - GENERAL
PAINLEVEL_OUTOF10: 0 - NO PAIN

## 2025-07-16 NOTE — CONSULTS
"Nutrition Initial Assessment:   Nutrition Assessment    Reason for Assessment: Provider consult order    Medical history per chart:   Delia Mckeon is a 89 y.o. female with PMHx of HTN who presented with nausea and vomiting. She was treated with antibiotics a week ago for a UTI but over the last few days PTA she had been vomiting.     7/16: Pt in chair during visit with son at bedside. Pt in and out of sleep, information from son. Pt only eating few bites per meal since 7/4 and has N/V for last few days. Pt used to eat well, 3 full meals per day. Son requesting easy to chew diet for suspected chewing difficulties. Pt agreed to magic cup BID.    Nutrition History:  Energy Intake: Poor < 50 %  Food and Nutrient History: Pt not eating well since 7/4. Pt normally eat s 3 full meals a day, home cooked by son.       Average meal Intake during admission: <25%   Dietary Orders (From admission, onward)       Start     Ordered    07/16/25 1502  Oral nutritional supplements  Until discontinued        Question Answer Comment   Deliver with Lunch    Deliver with Dinner    Select supplement: Magic Cup        07/16/25 1501    07/16/25 1502  Adult diet Regular; Easy to chew  Diet effective now        Question Answer Comment   Diet type Regular    Texture Easy to chew        07/16/25 1501    07/15/25 0244  May Participate in Room Service  ( ROOM SERVICE MAY PARTICIPATE)  Once        Question:  .  Answer:  Yes    07/15/25 0243                    Anthropometrics:  Height: (!) 154.9 cm (5' 0.98\")   Weight: 72.6 kg (160 lb 0.9 oz)   BMI (Calculated): 30.26  IBW/kg (Dietitian Calculated): 47.7 kg        Weight History:   Wt Readings from Last 10 Encounters:   07/16/25 72.6 kg (160 lb 0.9 oz)       Weight Change %:  Weight History / % Weight Change: # wt steady at #  Significant Weight Loss: No    Nutrition Focused Physical Exam Findings:  Subcutaneous Fat Loss:   Orbital Fat Pads: Mild-Moderate (slight dark circles and " slight hollowing)  Buccal Fat Pads: Mild-Moderate (flat cheeks, minimal bounce)  Triceps: Well nourished (ample fat tissue)  Muscle Wasting:  Temporalis: Mild-Moderate (slight depression)  Pectoralis (Clavicular Region): Well nourished (clavicle not visible)  Deltoid/Trapezius: Well nourished (rounded appearance at arm, shoulder, neck)  Edema:  Edema: none  Physical Findings:  Skin: Negative  Mouth Findings: Chewing difficulty    Nutrition Significant Labs:    Results from last 7 days   Lab Units 07/16/25  0410 07/15/25  2020 07/15/25  0438 07/13/25  1444   GLUCOSE mg/dL 130*  --  164* 169*   SODIUM mmol/L 132*  --  131* 130*   POTASSIUM mmol/L 3.6 3.7 2.7* 3.4*   CHLORIDE mmol/L 100  --  97* 92*   CO2 mmol/L 27  --  25 26   BUN mg/dL 20  --  23 18   CREATININE mg/dL 0.81  --  1.03 1.01   EGFR mL/min/1.73m*2 63  --  47* 48*   CALCIUM mg/dL 9.9  --  9.7 10.9*   MAGNESIUM mg/dL 1.58* 1.50* 1.72 1.28*     Lab Results   Component Value Date    HGBA1C 5.5 11/12/2024           Nutrition Specific Medications:  Meds reviewed/noted.   Scheduled Medications[1]   Continuous Medications[2]     I/O:    ;      Estimated Needs:   Total Energy Estimated Needs in 24 hours (kCal):  (1700kcal)  Method for Estimating Needs: Addison- Glennor x1.2 x1.4  Total Protein Estimated Needs in 24 Hours (g):  (76-91g)  Method for Estimating 24 Hour Protein Needs: 1.0-1.2g/kg  Total Fluid Estimated Needs in 24 Hours (mL):  (1700ml)  Method for Estimating 24 Hour Fluid Needs: 1ml/kcal        Nutrition Diagnosis   Malnutrition Diagnosis  Patient has Malnutrition Diagnosis: Yes  Diagnosis Status: New  Malnutrition Diagnosis: Severe malnutrition related to acute disease or injury  Related to: Sepsis secondary to UTI, Proteus mirabilis bacteremia, elevated WBC  As Evidenced by: <50% EER for >5 day, moderate fat losses (orbital, buccal), moderate muscle wasting (temporalis)    Nutrition Diagnosis  Patient has Nutrition Diagnosis: Yes  Diagnosis Status  (1): New  Nutrition Diagnosis 1: Inadequate energy intake  Related to (1): sepsis secondary to UTI  As Evidenced by (1): <50% EER for >5 day, moderate fat losses (orbital, buccal), moderate muscle wasting (temporalis)       Nutrition Interventions/Recommendations   Nutrition prescription for oral nutrition    Nutrition Recommendations:  Individualized Nutrition Prescription Provided for : Easy to chew diet due to chewing difficulties. Provide Magic Cup (290kcal, 9g protein) BID.    Nutrition Interventions/Goals:   Interventions: Medical food supplement, Meals and snacks  Meals and Snacks: Texture-modified diet  Goal: Easy to chew  Medical Food Supplement: Commercial beverage medical food supplement therapy  Goal: Provide Magic Cup (290kcal, 9g protein) BID.      Education Documentation  Nutrition Related Education, taught by Emily Dangelo at 7/16/2025  2:16 PM.  Learner: Family, Patient  Readiness: Acceptance  Method: Explanation  Response: Verbalizes Understanding  Comment: Discussed ONS and diet texture              Nutrition Monitoring and Evaluation   Food/Nutrient Related History Monitoring  Monitoring and Evaluation Plan: Estimated Energy Intake, Intake / amount of food  Estimated Energy Intake: Energy intake greater or equal to 75% of estimated energy needs  Intake / Amount of food: Consumes > or equal to 70% of supplement    Anthropometric Measurements  Monitoring and Evaluation Plan: Body weight  Body Weight: Body weight - Maintain stable weight    Biochemical Data, Medical Tests and Procedures  Monitoring and Evaluation Plan: Electrolyte/renal panel, Glucose/endocrine profile  Electrolyte and Renal Panel: Electrolytes within normal limits  Glucose/Endocrine Profile: Glucose within normal limits ( mg/dL)         Goal Status: New goal(s) identified    Time Spent (min): 45 minutes              [1] [START ON 7/17/2025] ceFAZolin, 2 g, intravenous, q12h  enoxaparin, 30 mg, subcutaneous,  Daily  magnesium sulfate, 2 g, intravenous, Once  perflutren lipid microspheres, 0.5-10 mL of dilution, intravenous, Once in imaging     [2] lactated Ringer's, 50 mL/hr

## 2025-07-16 NOTE — PROGRESS NOTES
Spoke to patients son at bedside. Patients son prefers to return home with no HHC. He denies any other home going needs at this time. Explained that if after discharge he feels like she would need HHC, he would need to reach out to her PCP for orders. [facility] will continue to follow for needs if they arise.

## 2025-07-16 NOTE — NURSING NOTE
1940: Received report from DONOVAN Pillai. Patient flipped into NSR at 1935. WILDA Jaramillo, notified. Orders placed.     2054: Messaged WILDA Jaramillo regarding labs K+ 3.7 and magnesium 1.50. See orders.     0456: Messaged Itz Lutz DO regarding HR 120s-130s, occasionally hitting 140s. See orders.

## 2025-07-16 NOTE — PROGRESS NOTES
Physical Therapy  Physical Therapy Treatment    Patient Name: Delia Mckeon  MRN: [id number]  Department: [address]  Room: 2021/2021-A  Today's Date: 7/16/2025  Time Calculation  Start Time: 0851  Stop Time: 0915  Time Calculation (min): 24 min    PT Plan  Treatment/Interventions: Bed mobility, Transfer training, Gait training, Stair training, Balance training, Neuromuscular re-education, Strengthening, Endurance training, Therapeutic exercise, Therapeutic activity, Home exercise program  PT Plan: Ongoing PT  PT Frequency: 4 times per week    PT Discharge Recommendations: Moderate intensity level of continued care (pending patient progress).  Based on current functional status and rehab potential, patient is anticipated to tolerate and benefit from 5 or more days per week of skilled rehabilitative therapy after discharge from this acute inpatient hospitalization.     Equipment Recommended upon Discharge:  (TBD)  PT Recommended Transfer Status: Assist x2  PT - OK to Discharge: Yes (When medically cleared)    PT Visit Info:  PT Received On: 07/16/25  Response to Previous Treatment: Patient with no complaints from previous session.     General Visit Information:   General  Reason for Referral: Impaired mobility    Precautions:  Falls   R nephrostomy drain    Pain:  Pain Assessment  0-10 (Numeric) Pain Score: 0 - No pain    Cognition:  Cognition  Orientation Level: Disoriented to time    Activity Tolerance:  Activity Tolerance  Endurance: Tolerates 10 - 20 min exercise with multiple rests    Treatments:  Therapeutic Exercise  5 ninute sit EOB, CGA    Bed Mobility  Bed Mobility: Yes  Bed Mobility 1  Bed Mobility 1: Supine to sitting  Level of Assistance 1: Moderate assistance  Bed Mobility 2  Bed Mobility  2: Scooting  Level of Assistance 2: Moderate assistance    Transfers  Transfer: Yes  Transfer 1  Technique 1: Sit to stand  Transfer Device 1: Walker  Transfer Level of Assistance 1: Moderate assistance, +2  Transfers  2  Technique 2: Stand to sit  Transfer Device 2: Walker  Transfer Level of Assistance 2: Moderate assistance, +2    Ambulation/Gait Training  Ambulation/Gait Training Performed: Yes  Ambulation/Gait Training 1  Comments/Distance (ft) 1: 3 feet from bed to chair from bed to chair          Pt sitting in bedside chair following tx. Alarm on and calll ight in reach.      Outcome Measures:  [facility] Basic Mobility  Turning from your back to your side while in a flat bed without using bedrails: A lot  Moving from lying on your back to sitting on the side of a flat bed without using bedrails: A lot  Moving to and from bed to chair (including a wheelchair): A lot  Standing up from a chair using your arms (e.g. wheelchair or bedside chair): A lot  To walk in hospital room: A lot  Climbing 3-5 steps with railing: Total  Basic Mobility - Total Score: 11    Education Documentation  Mobility Training, taught by Ayden Maradiaga PTA at 7/16/2025 12:44 PM.  Learner: Patient  Readiness: Acceptance  Method: Explanation, Demonstration  Response: Verbalizes Understanding, Needs Reinforcement    Education Comments  No comments found.        OP EDUCATION:       Encounter Problems       Encounter Problems (Active)       PT Problem       Transfers (Progressing)       Start:  07/15/25    Expected End:  07/29/25       Patient will perform all transfers with min A x 1 for increased safety           Gait (Progressing)       Start:  07/15/25    Expected End:  07/29/25       Patient will amb 40+ feet with min A x 1           Strengthening (Progressing)       Start:  07/15/25    Expected End:  07/29/25       Patient will perform 20+ reps of AROM for LINDA LE's to improve safety and functional independence

## 2025-07-16 NOTE — PROGRESS NOTES
HPI:  Delia Mckeon is a 89 y.o. female presenting with nausea, vomiting and altered mental status.     Found to have septic shock with gram-negative bacteremia, possible perforation of the ureter.  We were consulted for abnormal troponin.  No chest pain has been reported.  History obtained from son and daughter at bedside.  Patient had some mid back and low back pain but currently none.  She is lethargic but does wake up to stimulus and responded no to pain.     ECG shows wandering atrial rhythm without ST-T changes.  pMHx of HTN . She was treated with antibiotics a week ago for a UTI but over the last few days PTA she had been vomiting.    Patient had R nephrostomy tube on 7-14.  Today developed Afib with RVR (new DX)    Subjective Data:  We had seen the patient this admission for elevated troponin in the setting of R ureter stone/UTI/sepsis.  Troponin elevated was thought to be type II MI and we recommended medical tx and signed off.  We are now asked to see patient for Afib with RVR.  IMS started on a BB and then developed pauses.  We are asked to reevaluate.   Patient was napping in recliner chair with numerous family members in room.  After reviewed tele strips/alarms it appears patient had been in Afib overnight with a 2.7 sec post conversion pause today.  Currently in SR.    Na 132, K 3.6, mag 1.58 (replaced), hgb 10.8, WBC 13.4. Patient denies any CP/dyspnea.  States that she wants to go home.      Overnight Events:    New Afib     Objective Data:  Last Recorded Vitals:  Vitals:    07/15/25 2351 07/16/25 0346 07/16/25 0745 07/16/25 1115   BP: 103/66 121/80 127/82 150/80   BP Location: Right arm  Right arm Right arm   Patient Position: Lying Lying Lying Lying   Pulse: 96 109 110 95   Resp: 18 18 19 18   Temp: 36.1 °C (96.9 °F) 36.1 °C (97 °F) 36.6 °C (97.9 °F) 36.1 °C (97 °F)   TempSrc: Temporal Temporal Temporal Temporal   SpO2: 90% 91% 91% 95%   Weight:  72.6 kg (160 lb 0.9 oz)     Height:           Last  Labs:    Results from last 7 days   Lab Units 07/16/25  0410 07/15/25  2020 07/15/25  0438 07/13/25  1444   SODIUM mmol/L 132*  --  131* 130*   POTASSIUM mmol/L 3.6 3.7 2.7* 3.4*   CHLORIDE mmol/L 100  --  97* 92*   CO2 mmol/L 27  --  25 26   BUN mg/dL 20  --  23 18   CREATININE mg/dL 0.81  --  1.03 1.01   GLUCOSE mg/dL 130*  --  164* 169*   CALCIUM mg/dL 9.9  --  9.7 10.9*        Results from last 7 days   Lab Units 07/16/25  0410 07/15/25  0438 07/13/25  1444   WBC AUTO x10*3/uL 13.4* 16.8* 16.9*   HEMOGLOBIN g/dL 10.8* 10.1* 12.6   HEMATOCRIT % 30.9* 29.8* 35.5*   PLATELETS AUTO x10*3/uL 397 371 398               Last I/O:  I/O last 3 completed shifts:  In: 2644.6 (36.4 mL/kg) [I.V.:1694.6 (23.3 mL/kg); IV Piggyback:950]  Out: 675 (9.3 mL/kg) [Urine:675 (0.3 mL/kg/hr)]  Weight: 72.6 kg     Past Cardiology Tests (Last 3 Years):    Echo:  CONCLUSIONS:   1. Left ventricular ejection fraction is normal calculated by Barclay's biplane at 68%.   2. Spectral Doppler shows a Grade II (pseudonormal pattern) of left ventricular diastolic filling with an elevated left atrial pressure.   3. There is normal right ventricular global systolic function.    Inpatient Medications:  Scheduled Medications[1]  PRN Medications[2]  Continuous Medications[3]    ROS:  Review of Systems   Constitutional: Positive for malaise/fatigue. Negative for decreased appetite.        Lives at home with family member, ambulatory, needs assist with ADLs.    HENT: Negative.     Eyes:  Negative for blurred vision and visual disturbance.   Cardiovascular:  Positive for dyspnea on exertion.   Respiratory:  Negative for cough and shortness of breath.    Musculoskeletal:  Positive for arthritis. Negative for falls.   Gastrointestinal: Negative.    Neurological:  Negative for focal weakness and light-headedness.   Psychiatric/Behavioral:  Negative for depression and memory loss.         Physical Exam:  Physical Exam  Constitutional:       Appearance: Normal  appearance.   HENT:      Head: Normocephalic.     Eyes:      Conjunctiva/sclera: Conjunctivae normal.       Cardiovascular:      Rate and Rhythm: Normal rate and regular rhythm.      Pulses: Normal pulses.      Heart sounds: S1 normal and S2 normal. No murmur heard.     No friction rub. No gallop.   Pulmonary:      Effort: Pulmonary effort is normal.      Breath sounds: Normal breath sounds.   Abdominal:      General: Bowel sounds are normal.      Palpations: Abdomen is soft.      Tenderness: There is no abdominal tenderness.     Musculoskeletal:      Cervical back: Neck supple.      Right lower leg: No edema.      Left lower leg: No edema.     Skin:     General: Skin is warm and dry.     Neurological:      General: No focal deficit present.      Mental Status: She is alert.      Comments: Oriented x 2     Psychiatric:         Attention and Perception: Attention normal.      Comments: Flat affect          Assessment/Plan   1-elevated troponin-nonischemic myocardial injury in a patient with septic shock, UTI and possible bacteremia, perforated ureter.     No further cardiac workup would be indicated at this time.  Recommend treatment of underlying medical condition.    2-Atrial fibrillation:  Newly noted Afib that was present overnight. Spontaneously converted today with a 2.7 sec post conversion pause.  Had 1 dose of metoprolol but was stopped after a pause was reported by RN.  I reviewed the pause and it was a post conversion pause.  Would still not plan on continuing on a BB unless further Afib.  Currently in SR.  I had a long discussion with patient's family (Patient dosing off during my visit) and general agreement to not start on anticoagulation with her recent procedure and increase fall risk with her advanced age. Patient/family are aware of increased stroke risk but preferred more conservative management which seems reasonable.  I discussed above with Dr Mosquera as well.  Will continue to monitor on tele for  now.  Likely home on Friday as long as she continues to recover from underlying infection/urologic issues.      Will sign off  Please let us know if further Afib occurs.      Code Status:  No Order        ROBB Mata           [1]   Scheduled medications   Medication Dose Route Frequency    [START ON 7/17/2025] ceFAZolin  2 g intravenous q12h    enoxaparin  30 mg subcutaneous Daily    magnesium sulfate  2 g intravenous Once    [Held by provider] metoprolol tartrate  25 mg oral BID    perflutren lipid microspheres  0.5-10 mL of dilution intravenous Once in imaging   [2]   PRN medications   Medication    morphine    morphine    ondansetron   [3]   Continuous Medications   Medication Dose Last Rate

## 2025-07-16 NOTE — NURSING NOTE
During shift change RN's were alerted by monitor tech that HR jumped to 150s, this RN listened to apical, sounded irregular, EKG done,, appears to be new onset afib with RVR, oncoming night shift RN relayed info to night shift     No

## 2025-07-16 NOTE — PROGRESS NOTES
"[facility] MEDICINE PROGRESS NOTE    Subjective     Delia Mckeon is a 89 y.o. female with PMHx of HTN who presented with nausea and vomiting. She was treated with antibiotics a week ago for a UTI but over the last few days PTA she had been vomiting. At the bedside she is deeply asleep and son and  are at the bedside. Remainder of ROS reviewed and negative except as indicated in HPI.     7/16: She was tired appearing today. Son states she is near her baseline.     Objective     I personally reviewed all pertinent labwork, imaging and vital signs, as well as nursing, therapy, discharge planning and consult notes.     Visit Vitals  /80 (BP Location: Right arm, Patient Position: Lying)   Pulse 95   Temp 36.1 °C (97 °F) (Temporal)   Resp 18   Ht (!) 1.549 m (5' 0.98\")   Wt 72.6 kg (160 lb 0.9 oz)   SpO2 95%   BMI 30.26 kg/m²   OB Status Postmenopausal   Smoking Status Never   BSA 1.77 m²       Vitals:    07/16/25 0346   Weight: 72.6 kg (160 lb 0.9 oz)       Scheduled medications  Scheduled Medications[1]  Continuous medications  Continuous Medications[2]  PRN medications  PRN Medications[3]    The following labwork was reviewed:  Results for orders placed or performed during the hospital encounter of 07/13/25 (from the past 24 hours)   Potassium   Result Value Ref Range    Potassium 3.7 3.5 - 5.3 mmol/L   Magnesium   Result Value Ref Range    Magnesium 1.50 (L) 1.60 - 2.40 mg/dL   CBC   Result Value Ref Range    WBC 13.4 (H) 4.4 - 11.3 x10*3/uL    nRBC 0.0 0.0 - 0.0 /100 WBCs    RBC 3.38 (L) 4.00 - 5.20 x10*6/uL    Hemoglobin 10.8 (L) 12.0 - 16.0 g/dL    Hematocrit 30.9 (L) 36.0 - 46.0 %    MCV 91 80 - 100 fL    MCH 32.0 26.0 - 34.0 pg    MCHC 35.0 32.0 - 36.0 g/dL    RDW 12.5 11.5 - 14.5 %    Platelets 397 150 - 450 x10*3/uL   Basic Metabolic Panel   Result Value Ref Range    Glucose 130 (H) 74 - 99 mg/dL    Sodium 132 (L) 136 - 145 mmol/L    Potassium 3.6 3.5 - 5.3 mmol/L    Chloride 100 98 - " 107 mmol/L    Bicarbonate 27 21 - 32 mmol/L    Anion Gap 9 (L) 10 - 20 mmol/L    Urea Nitrogen 20 6 - 23 mg/dL    Creatinine 0.81 0.50 - 1.05 mg/dL    eGFR 63 >60 mL/min/1.73m*2    Calcium 9.9 8.6 - 10.3 mg/dL   Magnesium   Result Value Ref Range    Magnesium 1.58 (L) 1.60 - 2.40 mg/dL       The following imaging was reviewed:  IR placement of nephrostomy catheter  Result Date: 7/14/2025  1.  Uncomplicated and technically successful  8-[place] percutaneous nephrostomy tube placement -  right kidney.  The catheter was connected to external gravity drainage.   Performed and dictated at [facility].   MACRO: None   Signed by: Lavell Larry 7/14/2025 3:04 PM Dictation workstation:   [id number]    CT abdomen pelvis w IV contrast  Result Date: 7/13/2025  1. Obstructing calculi in the distal right ureter resulting in moderate hydronephrosis and a focal perforation the posterior wall of the mid ureter evidenced by increase in quantity of hyperdense fluid and extraluminal contrast adjacent to the may ureter as described below and above.   1 cm x 0.7 cm calculus obstructing the distal right ureter and several small calculi slightly distal to this. There is moderate hydroureteronephrosis and delayed nephrogram. There is diffuse thickening of the ureteral wall with hyperenhancement. There is loss of definition of the posterior wall of the right mid ureter adjacent to significant amount of periureteral fluid (axial image 76, series 2). On multiple subsequent delayed images there is increased quantity of fluid surrounding this portion of the ureter that is of high density and layering contrast can be seen on the 1st delayed phase image obtained beyond the wall of the ureter (axial image 80, series 7) consistent with a focal perforation in the posterior wall.   2. Cholelithiasis without evidence of acute cholecystitis.     MACRO: Nicolas Swift discussed the significance and urgency of this  critical finding by [facility] secure chat with Dr. Germain on 7/13/2025 at 6:01 pm.  (**-RCF-**) Findings:  See findings.   Signed by: Nicolas Swift 7/13/2025 6:02 PM Dictation workstation:   [id number]    XR chest 1 view  Result Date: 7/13/2025  1. Cardiomegaly with perihilar vascular congestion and small bilateral pleural effusion. Findings could be related to   Signed by: Gilberto Eng 7/13/2025 4:21 PM Dictation workstation:   [id number]      Constitutional: Well developed, sleepy, oriented x4, no acute distress, cooperative   Eyes: EOMI, clear sclerae   ENMT: mucous membranes moist, no lesions seen   Head/Neck: Neck supple, no apparent injury, head atraumatic   Respiratory/Thorax: CTAB anteriorly, good chest expansion, respirations even and unlabored, pt on 2L O2   Cardiovascular: Regular rate and rhythm, no murmurs/rubs/gallops, normal S1 and S 2   Gastrointestinal: Abdomen nondistended, soft, nontender, +BS, no bruits   Musculoskeletal: ROM intact, no joint swelling, normal  strength   Extremities: no cyanosis, contusions or clubbing, or edema   Neurological: no focal deficit, pt alert and oriented x4   Psychological: Appropriate affect and behavior, pleasant   Skin: Warm and dry, no lesions, no rashes     Medical History[4]    Assessment/Plan     Obstructive right distal ureter stone with associated hydroureteronephrosis   Proteus bacteremia  Abx narrowed to Ancef based on sensitivities   Pt is s/p percutaneous nephrostomy placement, no ureteral wall perforation was seen     NSTEMI  Cardiology recommending no further intervention, echocardiogram with HFpEF    Atrial Fibrillation   appears to be a new dx   started patient on BB on 7/16 but she is having sinus pauses so I held it   I asked cardiology to start following again.      Anorexia  Likely sequela of infection, will advance CLD but pt is not hungry, consult dietician    Hyperglycemia  No hx of diabetes, A1c was 5.5 last November     Mild  hyponatremia  Stable today, not much data in the EMR     Hypotension, resolved with IVF today  Continue to hold home BP meds for now as BP is overall controlled     DVT ppx: Lovenox     Discharge disposition  PT/OT rec SNF, son states he prefers home with [facility]     Min Mosquera MD PhD       [1] [START ON 7/17/2025] ceFAZolin, 2 g, intravenous, q12h  enoxaparin, 30 mg, subcutaneous, Daily  metoprolol tartrate, 25 mg, oral, BID  perflutren lipid microspheres, 0.5-10 mL of dilution, intravenous, Once in imaging     [2]    [3] PRN medications: morphine, morphine, ondansetron  [4]   Past Medical History:  Diagnosis Date    Hypertension

## 2025-07-16 NOTE — PROGRESS NOTES
Delia Mckeon is a 89 y.o. female on day 2 of admission presenting with Sepsis secondary to UTI (Multi).      Assessment/Plan:     #Proteus mirabilis bacteremia  #Right hydronephrosis s/p nephrostomy tube 7/14/2025  #Sepsis , poa, resolved  Zosyn switched to ceftriaxone and now on cefazolin.  Can DC on Augmentin     #Elevated troponin, likely type II  Likely due to sepsis.  Cardiology consulted and recommended no further management     HTN     Recommendations:     -DC ceftriaxone   -Start Ancef 2 g every 8 hour  -Can switch to Augmentin 875 3 times daily through 07/21  -No indication to repeat blood cultures.  Clinically improving  -Will continue to follow      Pawan Bo MD  Date of service: 7/16/2025  Time of service: 11:55 AM      Subjective   Interval History: No acute events overnight.  Patient denies any new complaint.  Overall feeling better.        Review of Systems  Denies: fever, chills, nausea, vomiting, diarrhea, dysuria    Objective   Range of Vitals (last 24 hours)  Heart Rate:  []   Temp:  [36.1 °C (96.9 °F)-36.6 °C (97.9 °F)]   Resp:  [17-19]   BP: (103-150)/(66-82)   Weight:  [72.6 kg (160 lb 0.9 oz)]   SpO2:  [90 %-95 %]  Daily Weight  07/16/25 : 72.6 kg (160 lb 0.9 oz)   Body mass index is 30.26 kg/m².    General: Awake, NAD  HEENT: No conjunctival pallor, no scleral icterus  Lungs: bilaterally clear to auscultation, no wheezing  Abdomen: soft, non tender, R nephrostomy tube  Neuro: AAO x 3, follow commands  Skin: No rashes or ulcers  MSK: No joint inflammation         Antibiotics  ceFAZolin - 2 gram/50 mL      Relevant Results  Labs  Results from last 72 hours   Lab Units 07/16/25  0410 07/15/25  0438 07/13/25  1444   WBC AUTO x10*3/uL 13.4* 16.8* 16.9*   HEMOGLOBIN g/dL 10.8* 10.1* 12.6   HEMATOCRIT % 30.9* 29.8* 35.5*   PLATELETS AUTO x10*3/uL 397 371 398   NEUTROS PCT AUTO %  --   --  94.3   LYMPHS PCT AUTO %  --   --  3.0   MONOS PCT AUTO %  --   --  1.1   EOS PCT AUTO %  --   --   "0.1     Results from last 72 hours   Lab Units 07/16/25  0410 07/15/25  2020 07/15/25  0438 07/13/25  1444   SODIUM mmol/L 132*  --  131* 130*   POTASSIUM mmol/L 3.6 3.7 2.7* 3.4*   CHLORIDE mmol/L 100  --  97* 92*   CO2 mmol/L 27  --  25 26   BUN mg/dL 20  --  23 18   CREATININE mg/dL 0.81  --  1.03 1.01   GLUCOSE mg/dL 130*  --  164* 169*   CALCIUM mg/dL 9.9  --  9.7 10.9*   ANION GAP mmol/L 9*  --  12 15   EGFR mL/min/1.73m*2 63  --  47* 48*     Results from last 72 hours   Lab Units 07/13/25  1444   ALK PHOS U/L 65   BILIRUBIN TOTAL mg/dL 1.1   BILIRUBIN DIRECT mg/dL 0.3   PROTEIN TOTAL g/dL 6.9   ALT U/L 11   AST U/L 15   ALBUMIN g/dL 3.5     Estimated Creatinine Clearance: 29.4 mL/min (by C-G formula based on SCr of 0.81 mg/dL).  No results found for: \"CRP\"    Microbiology  Susceptibility data from last 14 days.  Collected Specimen Info Organism Amikacin Ampicillin Cefazolin Ciprofloxacin Gentamicin Levofloxacin Piperacillin/Tazobactam Tetracycline Trimethoprim/Sulfamethoxazole   07/13/25 Blood culture from Peripheral Venipuncture Proteus mirabilis            07/13/25 Blood culture from Peripheral Venipuncture Proteus mirabilis  S  S  S  S  S  S  S  R  S       Imaging    IR placement of nephrostomy catheter  Result Date: 7/14/2025  1.  Uncomplicated and technically successful  8-[place] percutaneous nephrostomy tube placement -  right kidney.  The catheter was connected to external gravity drainage.   Performed and dictated at [facility].   MACRO: None   Signed by: Lavell Larry 7/14/2025 3:04 PM Dictation workstation:   [id number]    CT abdomen pelvis w IV contrast  Result Date: 7/13/2025  1. Obstructing calculi in the distal right ureter resulting in moderate hydronephrosis and a focal perforation the posterior wall of the mid ureter evidenced by increase in quantity of hyperdense fluid and extraluminal contrast adjacent to the may ureter as described below and above.   1 " cm x 0.7 cm calculus obstructing the distal right ureter and several small calculi slightly distal to this. There is moderate hydroureteronephrosis and delayed nephrogram. There is diffuse thickening of the ureteral wall with hyperenhancement. There is loss of definition of the posterior wall of the right mid ureter adjacent to significant amount of periureteral fluid (axial image 76, series 2). On multiple subsequent delayed images there is increased quantity of fluid surrounding this portion of the ureter that is of high density and layering contrast can be seen on the 1st delayed phase image obtained beyond the wall of the ureter (axial image 80, series 7) consistent with a focal perforation in the posterior wall.   2. Cholelithiasis without evidence of acute cholecystitis.     MACRO: Nicolas Swift discussed the significance and urgency of this critical finding by [facility] secure chat with Dr. Germain on 7/13/2025 at 6:01 pm.  (**-RCF-**) Findings:  See findings.   Signed by: Nicolas Swift 7/13/2025 6:02 PM Dictation workstation:   [id number]    XR chest 1 view  Result Date: 7/13/2025  1. Cardiomegaly with perihilar vascular congestion and small bilateral pleural effusion. Findings could be related to   Signed by: Gilberto Eng 7/13/2025 4:21 PM Dictation workstation:   [id number]

## 2025-07-16 NOTE — CARE PLAN
The patient's goals for the shift include  encouraging patient to   Problem: Nutrition  Goal: Nutrient intake appropriate for maintaining nutritional needs  7/16/2025 1733 by Pura Luu RN  Outcome: Progressing  7/16/2025 1205 by Pura Luu RN  Outcome: Progressing       The clinical goals for the shift include patient will try to have more oral intake during this shift    Over the shift, the patient did not make progress toward the following goals. Barriers to progression include not being awake most of the day. Recommendations to address these barriers include starting patient on IV fluids as well as offering oral hydration on hourly rounding

## 2025-07-16 NOTE — PROGRESS NOTES
Occupational Therapy    Occupational Therapy Treatment    Name: Delia Mckeon  MRN: [id number]  Department: [address]  Room: 2021/2021-  Date: 07/16/25  Time Calculation  Start Time: 0852  Stop Time: 0915  Time Calculation (min): 23 min    Assessment:  OT Assessment: Pt progressed activity tolerance to participating in functional transfers. Pt requires mod A x2 to stand however once upright max A x2 to safely complete functional transfers with max verbal cues for safely, sequencing, and transfer technique.  Barriers to Discharge Home: Physical needs  End of Session Communication: Bedside nurse  End of Session Patient Position: Up in chair, Alarm on  Plan:  Treatment Interventions: ADL retraining, Functional transfer training, Endurance training, Equipment evaluation/education, Patient/family training, Compensatory technique education  OT Frequency: 4 times per week  OT Discharge Recommendations: Moderate intensity level of continued care (pending family assist available)  OT Recommended Transfer Status: Maximum assist, Assist of 2  OT - OK to Discharge: Yes    Subjective     OT Visit Info:  OT Received On: 07/16/25  General:  General  Co-Treatment: PT  Co-Treatment Reason: For increased safety with mobility  Prior to Session Communication: Bedside nurse  Patient Position Received: Bed, 3 rail up, Alarm on  General Comment: Pt agreeable to therapy with encouragement. Pt son present for tx session.  Precautions:  Hearing/Visual Limitations: [place]  Medical Precautions: Fall precautions  Precautions Comment: R nephrostomy drain in place        Pain Assessment:  Pain Assessment  Pain Assessment: 0-10  0-10 (Numeric) Pain Score: 0 - No pain    Objective   Cognition:  Orientation Level: Disoriented to time  Activities of Daily Living:      Grooming  Grooming Level of Assistance: Moderate assistance, Moderate verbal cues           Functional Standing Tolerance:  Functional Standing Tolerance  Activity: Pt completed x2 static  stands tolerating for 30 seconds to 1 minute with mod A x2 using arm in arm assist.  Bed Mobility/Transfers: Bed Mobility 1  Bed Mobility 1: Supine to sitting  Level of Assistance 1: Moderate assistance, +2    Transfer 1  Technique 1: Sit to stand, Stand to sit  Transfer Device 1: Walker  Transfer Level of Assistance 1: Moderate assistance, +2  Transfers 2  Transfer From 2: Bed to  Transfer to 2: Chair with arms  Technique 2: Sit to stand, Stand to sit  Transfer Device 2:  (arm in arm assist)  Transfer Level of Assistance 2: Maximum assistance, +2, Maximum verbal cues               Therapy/Activity:      Therapeutic Activity  Therapeutic Activity Performed: Yes  Therapeutic Activity 1: Pt tolerated sitting EOB for 7 minutes with min A.      Outcome Measures:  [facility] Daily Activity  Putting on and taking off regular lower body clothing: A lot  Bathing (including washing, rinsing, drying): A lot  Putting on and taking off regular upper body clothing: A lot  Toileting, which includes using toilet, bedpan or urinal: A lot  Taking care of personal grooming such as brushing teeth: A little  Eating Meals: A little  Daily Activity - Total Score: 14        Education Documentation  Body Mechanics, taught by ESE Galeana at 7/16/2025 12:16 PM.  Learner: Patient  Readiness: Acceptance  Method: Explanation  Response: Needs Reinforcement  Comment: functional transfer training    Education Comments  No comments found.      Goals:  Encounter Problems       Encounter Problems (Active)       BALANCE       Pt will maintain dynamic sitting balance during ADL task with stand by assist level of assistance in order to demonstrate decreased risk of falling and improved postural control. (Progressing)       Start:  07/15/25    Expected End:  07/29/25               MOBILITY       Patient will perform Functional mobility  Household distances/Community Distances with minimal assist  level of assistance and least restrictive device in  order to improve safety and functional mobility. (Progressing)       Start:  07/15/25    Expected End:  07/29/25               TRANSFERS       Patient will complete functional transfers with least restrictive device with minimal assist  level of assistance. (Progressing)       Start:  07/15/25    Expected End:  07/29/25

## 2025-07-17 LAB
ANION GAP SERPL CALC-SCNC: 13 MMOL/L (ref 10–20)
BACTERIA UR CULT: NORMAL
BUN SERPL-MCNC: 15 MG/DL (ref 6–23)
CALCIUM SERPL-MCNC: 9.8 MG/DL (ref 8.6–10.3)
CHLORIDE SERPL-SCNC: 100 MMOL/L (ref 98–107)
CO2 SERPL-SCNC: 26 MMOL/L (ref 21–32)
CREAT SERPL-MCNC: 0.67 MG/DL (ref 0.5–1.05)
EGFRCR SERPLBLD CKD-EPI 2021: 76 ML/MIN/1.73M*2
ERYTHROCYTE [DISTWIDTH] IN BLOOD BY AUTOMATED COUNT: 12.4 % (ref 11.5–14.5)
GLUCOSE SERPL-MCNC: 115 MG/DL (ref 74–99)
HCT VFR BLD AUTO: 29.8 % (ref 36–46)
HGB BLD-MCNC: 10.3 G/DL (ref 12–16)
MAGNESIUM SERPL-MCNC: 1.73 MG/DL (ref 1.6–2.4)
MCH RBC QN AUTO: 32.2 PG (ref 26–34)
MCHC RBC AUTO-ENTMCNC: 34.6 G/DL (ref 32–36)
MCV RBC AUTO: 93 FL (ref 80–100)
NRBC BLD-RTO: 0 /100 WBCS (ref 0–0)
PLATELET # BLD AUTO: 395 X10*3/UL (ref 150–450)
POTASSIUM SERPL-SCNC: 3.5 MMOL/L (ref 3.5–5.3)
RBC # BLD AUTO: 3.2 X10*6/UL (ref 4–5.2)
SODIUM SERPL-SCNC: 135 MMOL/L (ref 136–145)
WBC # BLD AUTO: 11.5 X10*3/UL (ref 4.4–11.3)

## 2025-07-17 PROCEDURE — 83735 ASSAY OF MAGNESIUM: CPT | Performed by: INTERNAL MEDICINE

## 2025-07-17 PROCEDURE — 36415 COLL VENOUS BLD VENIPUNCTURE: CPT | Performed by: INTERNAL MEDICINE

## 2025-07-17 PROCEDURE — 2500000002 HC RX 250 W HCPCS SELF ADMINISTERED DRUGS (ALT 637 FOR MEDICARE OP, ALT 636 FOR OP/ED): Performed by: INTERNAL MEDICINE

## 2025-07-17 PROCEDURE — 99233 SBSQ HOSP IP/OBS HIGH 50: CPT | Performed by: INTERNAL MEDICINE

## 2025-07-17 PROCEDURE — 2060000001 HC INTERMEDIATE ICU ROOM DAILY

## 2025-07-17 PROCEDURE — 2500000004 HC RX 250 GENERAL PHARMACY W/ HCPCS (ALT 636 FOR OP/ED)

## 2025-07-17 PROCEDURE — 85027 COMPLETE CBC AUTOMATED: CPT | Performed by: INTERNAL MEDICINE

## 2025-07-17 PROCEDURE — 97530 THERAPEUTIC ACTIVITIES: CPT | Mod: GO,CO

## 2025-07-17 PROCEDURE — 80048 BASIC METABOLIC PNL TOTAL CA: CPT | Performed by: INTERNAL MEDICINE

## 2025-07-17 PROCEDURE — 97116 GAIT TRAINING THERAPY: CPT | Mod: CQ,GP

## 2025-07-17 PROCEDURE — 2500000004 HC RX 250 GENERAL PHARMACY W/ HCPCS (ALT 636 FOR OP/ED): Performed by: INTERNAL MEDICINE

## 2025-07-17 PROCEDURE — 2500000004 HC RX 250 GENERAL PHARMACY W/ HCPCS (ALT 636 FOR OP/ED): Performed by: STUDENT IN AN ORGANIZED HEALTH CARE EDUCATION/TRAINING PROGRAM

## 2025-07-17 RX ORDER — MAGNESIUM SULFATE HEPTAHYDRATE 40 MG/ML
2 INJECTION, SOLUTION INTRAVENOUS ONCE
Status: COMPLETED | OUTPATIENT
Start: 2025-07-17 | End: 2025-07-17

## 2025-07-17 RX ORDER — CEFAZOLIN SODIUM 2 G/50ML
2 SOLUTION INTRAVENOUS EVERY 8 HOURS
Status: DISCONTINUED | OUTPATIENT
Start: 2025-07-17 | End: 2025-07-18 | Stop reason: HOSPADM

## 2025-07-17 RX ORDER — POTASSIUM CHLORIDE 750 MG/1
40 TABLET, FILM COATED, EXTENDED RELEASE ORAL 2 TIMES DAILY
Status: COMPLETED | OUTPATIENT
Start: 2025-07-17 | End: 2025-07-17

## 2025-07-17 RX ADMIN — POTASSIUM CHLORIDE 40 MEQ: 750 TABLET, FILM COATED, EXTENDED RELEASE ORAL at 11:47

## 2025-07-17 RX ADMIN — MAGNESIUM SULFATE HEPTAHYDRATE 2 G: 40 INJECTION, SOLUTION INTRAVENOUS at 09:17

## 2025-07-17 RX ADMIN — CEFAZOLIN SODIUM 2 G: 2 SOLUTION INTRAVENOUS at 08:36

## 2025-07-17 RX ADMIN — CEFAZOLIN SODIUM 2 G: 2 SOLUTION INTRAVENOUS at 17:54

## 2025-07-17 RX ADMIN — POTASSIUM CHLORIDE 40 MEQ: 750 TABLET, FILM COATED, EXTENDED RELEASE ORAL at 20:46

## 2025-07-17 RX ADMIN — ENOXAPARIN SODIUM 30 MG: 30 INJECTION SUBCUTANEOUS at 08:36

## 2025-07-17 ASSESSMENT — COGNITIVE AND FUNCTIONAL STATUS - GENERAL
DRESSING REGULAR LOWER BODY CLOTHING: A LOT
PERSONAL GROOMING: A LITTLE
TURNING FROM BACK TO SIDE WHILE IN FLAT BAD: A LITTLE
CLIMB 3 TO 5 STEPS WITH RAILING: TOTAL
TOILETING: A LOT
EATING MEALS: A LITTLE
DRESSING REGULAR LOWER BODY CLOTHING: A LOT
DRESSING REGULAR UPPER BODY CLOTHING: A LITTLE
PERSONAL GROOMING: A LITTLE
STANDING UP FROM CHAIR USING ARMS: A LOT
WALKING IN HOSPITAL ROOM: A LOT
EATING MEALS: A LITTLE
HELP NEEDED FOR BATHING: A LOT
MOBILITY SCORE: 11
DAILY ACTIVITIY SCORE: 14
MOVING TO AND FROM BED TO CHAIR: A LOT
MOVING FROM LYING ON BACK TO SITTING ON SIDE OF FLAT BED WITH BEDRAILS: A LITTLE
CLIMB 3 TO 5 STEPS WITH RAILING: TOTAL
MOBILITY SCORE: 13
STANDING UP FROM CHAIR USING ARMS: A LOT
HELP NEEDED FOR BATHING: A LOT
TOILETING: A LOT
TURNING FROM BACK TO SIDE WHILE IN FLAT BAD: A LOT
WALKING IN HOSPITAL ROOM: A LOT
DAILY ACTIVITIY SCORE: 15
MOVING FROM LYING ON BACK TO SITTING ON SIDE OF FLAT BED WITH BEDRAILS: A LOT
DRESSING REGULAR UPPER BODY CLOTHING: A LOT
MOVING TO AND FROM BED TO CHAIR: A LOT

## 2025-07-17 ASSESSMENT — PAIN SCALES - GENERAL
PAINLEVEL_OUTOF10: 0 - NO PAIN

## 2025-07-17 ASSESSMENT — PAIN - FUNCTIONAL ASSESSMENT
PAIN_FUNCTIONAL_ASSESSMENT: 0-10

## 2025-07-17 NOTE — CARE PLAN
The patient's goals for the shift include      The clinical goals for the shift include vitals will remain stable and no falls.      Problem: Pain - Adult  Goal: Verbalizes/displays adequate comfort level or baseline comfort level  7/16/2025 2108 by Jesus Manuel Goldberg RN  Outcome: Progressing  7/16/2025 2105 by Jesus Manuel Goldberg RN  Outcome: Progressing     Problem: Safety - Adult  Goal: Free from fall injury  7/16/2025 2108 by Jesus Manuel Goldberg RN  Outcome: Progressing  7/16/2025 2105 by Jesus Manuel Goldberg RN  Outcome: Progressing     Problem: Discharge Planning  Goal: Discharge to home or other facility with appropriate resources  7/16/2025 2108 by Jesus Manuel Goldberg RN  Outcome: Progressing  7/16/2025 2105 by Jesus Manuel Goldberg RN  Outcome: Progressing     Problem: Chronic Conditions and Co-morbidities  Goal: Patient's chronic conditions and co-morbidity symptoms are monitored and maintained or improved  7/16/2025 2108 by Jesus Manuel Goldberg RN  Outcome: Progressing  7/16/2025 2105 by Jesus Manuel Goldberg RN  Outcome: Progressing     Problem: Nutrition  Goal: Nutrient intake appropriate for maintaining nutritional needs  7/16/2025 2108 by Jesus Manuel Goldberg RN  Outcome: Progressing  7/16/2025 2105 by Jesus Manuel Goldberg RN  Outcome: Progressing     Problem: Skin  Goal: Decreased wound size/increased tissue granulation at next dressing change  7/16/2025 2108 by Jesus Manuel Goldberg RN  Outcome: Progressing  7/16/2025 2105 by Jesus Manuel Goldberg RN  Outcome: Progressing  Goal: Participates in plan/prevention/treatment measures  7/16/2025 2108 by Jesus Manuel Goldberg RN  Outcome: Progressing  7/16/2025 2105 by Jesus Manuel Goldberg RN  Outcome: Progressing  Goal: Prevent/manage excess moisture  7/16/2025 2108 by Jesus Manuel Goldberg RN  Outcome: Progressing  7/16/2025 2105 by Jesus Manuel Goldberg RN  Outcome: Progressing  Goal: Prevent/minimize sheer/friction injuries  7/16/2025 2108 by Jesus Manuel Goldberg RN  Outcome: Progressing  7/16/2025 2105 by Jesus Manuel Goldberg RN  Outcome:  Progressing  Goal: Promote/optimize nutrition  7/16/2025 2108 by Jesus Manuel Goldberg RN  Outcome: Progressing  7/16/2025 2105 by Jesus Manuel Goldberg RN  Outcome: Progressing  Goal: Promote skin healing  7/16/2025 2108 by Jesus Manuel Goldberg RN  Outcome: Progressing  7/16/2025 2105 by Jesus Manuel Goldberg RN  Outcome: Progressing     Problem: Pain  Goal: Takes deep breaths with improved pain control throughout the shift  7/16/2025 2108 by Jesus Manuel Goldberg RN  Outcome: Progressing  7/16/2025 2105 by Jesus Manuel Goldberg RN  Outcome: Progressing  Goal: Turns in bed with improved pain control throughout the shift  7/16/2025 2108 by Jesus Manuel Goldberg RN  Outcome: Progressing  7/16/2025 2105 by Jesus Manuel Goldberg RN  Outcome: Progressing  Goal: Walks with improved pain control throughout the shift  7/16/2025 2108 by Jesus Manuel Goldberg RN  Outcome: Progressing  7/16/2025 2105 by Jesus Manuel Goldberg RN  Outcome: Progressing  Goal: Performs ADL's with improved pain control throughout shift  7/16/2025 2108 by Jesus Manuel Goldberg RN  Outcome: Progressing  7/16/2025 2105 by Jesus Manuel Goldberg RN  Outcome: Progressing  Goal: Participates in PT with improved pain control throughout the shift  7/16/2025 2108 by Jesus Manuel Goldberg RN  Outcome: Progressing  7/16/2025 2105 by Jesus Manuel Goldberg RN  Outcome: Progressing  Goal: Free from opioid side effects throughout the shift  7/16/2025 2108 by Jesus Manuel Goldberg RN  Outcome: Progressing  7/16/2025 2105 by Jesus Manuel Goldberg RN  Outcome: Progressing  Goal: Free from acute confusion related to pain meds throughout the shift  7/16/2025 2108 by Jesus Manuel Goldberg RN  Outcome: Progressing  7/16/2025 2105 by Jesus Manuel Goldberg RN  Outcome: Progressing     Problem: Fall/Injury  Goal: Not fall by end of shift  7/16/2025 2108 by Jesus Manuel Goldberg RN  Outcome: Progressing  7/16/2025 2105 by Jesus Manuel Goldberg RN  Outcome: Progressing  Goal: Be free from injury by end of the shift  7/16/2025 2108 by Jesus Manuel Goldberg RN  Outcome: Progressing  7/16/2025 2105  by Jesus Manuel Puruczky, RN  Outcome: Progressing  Goal: Verbalize understanding of personal risk factors for fall in the hospital  7/16/2025 2108 by Jesus Manuel Goldberg RN  Outcome: Progressing  7/16/2025 2105 by Jesus Manuel Goldberg RN  Outcome: Progressing  Goal: Verbalize understanding of risk factor reduction measures to prevent injury from fall in the home  7/16/2025 2108 by Jesus Manuel Goldberg RN  Outcome: Progressing  7/16/2025 2105 by Jesus Manuel Goldberg RN  Outcome: Progressing  Goal: Use assistive devices by end of the shift  7/16/2025 2108 by Jesus Manuel Goldberg RN  Outcome: Progressing  7/16/2025 2105 by Jesus Manuel Goldberg RN  Outcome: Progressing  Goal: Pace activities to prevent fatigue by end of the shift  7/16/2025 2108 by Jesus Manuel Goldberg RN  Outcome: Progressing  7/16/2025 2105 by Jesus Manuel Goldberg RN  Outcome: Progressing

## 2025-07-17 NOTE — PROGRESS NOTES
"Delia Mckeon is a 89 y.o. female on day 3 of admission presenting with Sepsis secondary to UTI (Multi).      Assessment/Plan:     #Proteus mirabilis bacteremia  #Right hydronephrosis s/p nephrostomy tube 7/14/2025  #Sepsis , poa, resolved  Zosyn switched to ceftriaxone and now on cefazolin.  Can DC on Augmentin     #Elevated troponin, likely type II  Likely due to sepsis.  Cardiology consulted and recommended no further management     HTN     Recommendations:     -Cont Ancef 2 g every 8 hour  -Can switch to Augmentin 875 3 times daily through 07/21  -No indication to repeat blood cultures.  Clinically improving  -Will continue to follow      Pawan Bo MD  Date of service: 7/17/2025  Time of service: 1:12 PM      Subjective   Interval History: No acute events overnight.  Patient denies any new complaint.  Overall feeling better.        Review of Systems  Denies: fever, chills, nausea, vomiting, diarrhea, dysuria    Objective   Range of Vitals (last 24 hours)  Heart Rate:  [71-98]   Temp:  [36.4 °C (97.6 °F)-37.3 °C (99.2 °F)]   Resp:  [17-18]   BP: (142-165)/(69-85)   Height:  [154.9 cm (5' 0.98\")]   Weight:  [72.3 kg (159 lb 6.3 oz)-72.6 kg (160 lb 0.9 oz)]   SpO2:  [92 %-95 %]  Daily Weight  07/17/25 : 72.3 kg (159 lb 6.3 oz)   Body mass index is 30.13 kg/m².    General: Awake, NAD  HEENT: No conjunctival pallor, no scleral icterus  Lungs: bilaterally clear to auscultation, no wheezing  Abdomen: soft, non tender, R nephrostomy tube  Neuro: AAO x 3, follow commands  Skin: No rashes or ulcers  MSK: No joint inflammation         Antibiotics  ceFAZolin - 2 gram/50 mL      Relevant Results  Labs  Results from last 72 hours   Lab Units 07/17/25  0422 07/16/25  0410 07/15/25  0438   WBC AUTO x10*3/uL 11.5* 13.4* 16.8*   HEMOGLOBIN g/dL 10.3* 10.8* 10.1*   HEMATOCRIT % 29.8* 30.9* 29.8*   PLATELETS AUTO x10*3/uL 395 397 371     Results from last 72 hours   Lab Units 07/17/25  0422 07/16/25  0410 07/15/25  2020 " "07/15/25  0438   SODIUM mmol/L 135* 132*  --  131*   POTASSIUM mmol/L 3.5 3.6 3.7 2.7*   CHLORIDE mmol/L 100 100  --  97*   CO2 mmol/L 26 27  --  25   BUN mg/dL 15 20  --  23   CREATININE mg/dL 0.67 0.81  --  1.03   GLUCOSE mg/dL 115* 130*  --  164*   CALCIUM mg/dL 9.8 9.9  --  9.7   ANION GAP mmol/L 13 9*  --  12   EGFR mL/min/1.73m*2 76 63  --  47*           Estimated Creatinine Clearance: 35.5 mL/min (by C-G formula based on SCr of 0.67 mg/dL).  No results found for: \"CRP\"    Microbiology  Susceptibility data from last 14 days.  Collected Specimen Info Organism Amikacin Ampicillin Cefazolin Ciprofloxacin Gentamicin Levofloxacin Piperacillin/Tazobactam Tetracycline Trimethoprim/Sulfamethoxazole   07/13/25 Blood culture from Peripheral Venipuncture Proteus mirabilis            07/13/25 Blood culture from Peripheral Venipuncture Proteus mirabilis  S  S  S  S  S  S  S  R  S       Imaging    IR placement of nephrostomy catheter  Result Date: 7/14/2025  1.  Uncomplicated and technically successful  8-Upper sorbian percutaneous nephrostomy tube placement -  right kidney.  The catheter was connected to external gravity drainage.   Performed and dictated at [facility].   MACRO: None   Signed by: Lavell Larry 7/14/2025 3:04 PM Dictation workstation:   [id number]    CT abdomen pelvis w IV contrast  Result Date: 7/13/2025  1. Obstructing calculi in the distal right ureter resulting in moderate hydronephrosis and a focal perforation the posterior wall of the mid ureter evidenced by increase in quantity of hyperdense fluid and extraluminal contrast adjacent to the may ureter as described below and above.   1 cm x 0.7 cm calculus obstructing the distal right ureter and several small calculi slightly distal to this. There is moderate hydroureteronephrosis and delayed nephrogram. There is diffuse thickening of the ureteral wall with hyperenhancement. There is loss of definition of the posterior wall " of the right mid ureter adjacent to significant amount of periureteral fluid (axial image 76, series 2). On multiple subsequent delayed images there is increased quantity of fluid surrounding this portion of the ureter that is of high density and layering contrast can be seen on the 1st delayed phase image obtained beyond the wall of the ureter (axial image 80, series 7) consistent with a focal perforation in the posterior wall.   2. Cholelithiasis without evidence of acute cholecystitis.     MACRO: Nicolas wSift discussed the significance and urgency of this critical finding by EPIC secure chat with Dr. Germain on 7/13/2025 at 6:01 pm.  (**-RCF-**) Findings:  See findings.   Signed by: Nicolas Swift 7/13/2025 6:02 PM Dictation workstation:   [id number]    XR chest 1 view  Result Date: 7/13/2025  1. Cardiomegaly with perihilar vascular congestion and small bilateral pleural effusion. Findings could be related to   Signed by: Gilberto Eng 7/13/2025 4:21 PM Dictation workstation:   [id number]

## 2025-07-17 NOTE — PROGRESS NOTES
"[facility] MEDICINE PROGRESS NOTE    Subjective     Delia Mckeon is a 89 y.o. female with PMHx of HTN who presented with nausea and vomiting. She was treated with antibiotics a week ago for a UTI but over the last few days PTA she had been vomiting. At the bedside she is deeply asleep and son and  are at the bedside. Remainder of ROS reviewed and negative except as indicated in HPI.     7/16: She was tired appearing today. Son states she is near her baseline.     7/17: She remains tired appearing, which, per her son, is her baseline.     Objective     I personally reviewed all pertinent labwork, imaging and vital signs, as well as nursing, therapy, discharge planning and consult notes.     Visit Vitals  /75 (BP Location: Right arm, Patient Position: Lying)   Pulse 85   Temp 36.8 °C (98.2 °F) (Temporal)   Resp 18   Ht (!) 1.549 m (5' 0.98\")   Wt 72.3 kg (159 lb 6.3 oz)   SpO2 93%   BMI 30.13 kg/m²   OB Status Postmenopausal   Smoking Status Never   BSA 1.76 m²       Vitals:    07/17/25 0427   Weight: 72.3 kg (159 lb 6.3 oz)       Scheduled medications  Scheduled Medications[1]  Continuous medications  Continuous Medications[2]  PRN medications  PRN Medications[3]    The following labwork was reviewed:  Results for orders placed or performed during the hospital encounter of 07/13/25 (from the past 24 hours)   Basic Metabolic Panel   Result Value Ref Range    Glucose 115 (H) 74 - 99 mg/dL    Sodium 135 (L) 136 - 145 mmol/L    Potassium 3.5 3.5 - 5.3 mmol/L    Chloride 100 98 - 107 mmol/L    Bicarbonate 26 21 - 32 mmol/L    Anion Gap 13 10 - 20 mmol/L    Urea Nitrogen 15 6 - 23 mg/dL    Creatinine 0.67 0.50 - 1.05 mg/dL    eGFR 76 >60 mL/min/1.73m*2    Calcium 9.8 8.6 - 10.3 mg/dL   Magnesium   Result Value Ref Range    Magnesium 1.73 1.60 - 2.40 mg/dL   CBC   Result Value Ref Range    WBC 11.5 (H) 4.4 - 11.3 x10*3/uL    nRBC 0.0 0.0 - 0.0 /100 WBCs    RBC 3.20 (L) 4.00 - 5.20 x10*6/uL    " Hemoglobin 10.3 (L) 12.0 - 16.0 g/dL    Hematocrit 29.8 (L) 36.0 - 46.0 %    MCV 93 80 - 100 fL    MCH 32.2 26.0 - 34.0 pg    MCHC 34.6 32.0 - 36.0 g/dL    RDW 12.4 11.5 - 14.5 %    Platelets 395 150 - 450 x10*3/uL       The following imaging was reviewed:  IR placement of nephrostomy catheter  Result Date: 7/14/2025  1.  Uncomplicated and technically successful  8-Vietnamese percutaneous nephrostomy tube placement -  right kidney.  The catheter was connected to external gravity drainage.   Performed and dictated at [facility].   MACRO: None   Signed by: Lavell Larry 7/14/2025 3:04 PM Dictation workstation:   [id number]    CT abdomen pelvis w IV contrast  Result Date: 7/13/2025  1. Obstructing calculi in the distal right ureter resulting in moderate hydronephrosis and a focal perforation the posterior wall of the mid ureter evidenced by increase in quantity of hyperdense fluid and extraluminal contrast adjacent to the may ureter as described below and above.   1 cm x 0.7 cm calculus obstructing the distal right ureter and several small calculi slightly distal to this. There is moderate hydroureteronephrosis and delayed nephrogram. There is diffuse thickening of the ureteral wall with hyperenhancement. There is loss of definition of the posterior wall of the right mid ureter adjacent to significant amount of periureteral fluid (axial image 76, series 2). On multiple subsequent delayed images there is increased quantity of fluid surrounding this portion of the ureter that is of high density and layering contrast can be seen on the 1st delayed phase image obtained beyond the wall of the ureter (axial image 80, series 7) consistent with a focal perforation in the posterior wall.   2. Cholelithiasis without evidence of acute cholecystitis.     MACRO: Nicolas Swift discussed the significance and urgency of this critical finding by EPIC secure chat with Dr. Germain on 7/13/2025 at 6:01  pm.  (**-RCF-**) Findings:  See findings.   Signed by: Nicolas Swift 7/13/2025 6:02 PM Dictation workstation:   [id number]    XR chest 1 view  Result Date: 7/13/2025  1. Cardiomegaly with perihilar vascular congestion and small bilateral pleural effusion. Findings could be related to   Signed by: Gilberto Eng 7/13/2025 4:21 PM Dictation workstation:   [id number]      Constitutional: Well developed, sleepy, oriented x4, no acute distress, cooperative   Eyes: EOMI, clear sclerae   ENMT: mucous membranes moist, no lesions seen   Head/Neck: Neck supple, no apparent injury, head atraumatic   Respiratory/Thorax: CTAB anteriorly, good chest expansion, respirations even and unlabored, pt on 2L O2   Cardiovascular: Regular rate and rhythm, no murmurs/rubs/gallops, normal S1 and S 2   Gastrointestinal: Abdomen nondistended, soft, nontender, +BS, no bruits   Musculoskeletal: ROM intact, no joint swelling, normal  strength   Extremities: no cyanosis, contusions or clubbing, or edema   Neurological: no focal deficit, pt alert and oriented x4   Psychological: Appropriate affect and behavior, pleasant   Skin: Warm and dry, no lesions, no rashes     Medical History[4]    Assessment/Plan     Obstructive right distal ureter stone with associated hydroureteronephrosis   Proteus bacteremia  Abx narrowed to Ancef based on sensitivities   Augmentin through 07/21   Pt is s/p percutaneous nephrostomy placement, no ureteral wall perforation was seen     NSTEMI  Cardiology recommending no further intervention, echocardiogram with HFpEF    Atrial Fibrillation   appears to be a new dx   Based on patients advanced age cardiology does not recommend any rate control or stroke ppx.      Anorexia  Likely sequela of infection, will advance CLD but pt is not hungry, consult dietician    Mild hyponatremia  Improved with volume     Hypotension, resolved with IVF today  Fold back in home meds as able      DVT ppx: Lovenox     Discharge  disposition  PT/OT rec SNF, son states he prefers home with [facility]     Min Mosquera MD PhD         [1] ceFAZolin, 2 g, intravenous, q8h  enoxaparin, 30 mg, subcutaneous, Daily  perflutren lipid microspheres, 0.5-10 mL of dilution, intravenous, Once in imaging  potassium chloride CR, 40 mEq, oral, BID     [2] lactated Ringer's, 50 mL/hr, Last Rate: Stopped (07/17/25 1153)     [3] PRN medications: morphine, morphine, ondansetron  [4]   Past Medical History:  Diagnosis Date    Hypertension

## 2025-07-17 NOTE — PROGRESS NOTES
Occupational Therapy    Occupational Therapy Treatment    Name: Delia Mckeon  MRN: [id number]  Department: [address]  Room: 2021/2021-  Date: 07/17/25  Time Calculation  Start Time: 1132  Stop Time: 1155  Time Calculation (min): 23 min    Assessment:  OT Assessment: Pt progressed activity tolerance to participating in functional mobility < min household distance with mod A x2 and arm in arm assist. Pt demo'd overall improved activity tolerance.  Barriers to Discharge Home: Physical needs  End of Session Communication: Bedside nurse  End of Session Patient Position: Up in chair, Alarm on  Plan:  Treatment Interventions: ADL retraining, Functional transfer training, Endurance training, Equipment evaluation/education, Patient/family training, Compensatory technique education  OT Frequency: 4 times per week (DURING ACUTE HOSPITAL STAY)  OT Discharge Recommendations: Moderate intensity level of continued care (pending family assist available) Based on current functional status and rehab potential, patient is anticipated to tolerate and benefit from 5 or more days per week of skilled rehabilitative therapy after discharge from this acute inpatient hospitalization.   OT Recommended Transfer Status: Moderate assist, Assist of 2  OT - OK to Discharge: Yes    Subjective     OT Visit Info:  OT Received On: 07/17/25  General:  General  Co-Treatment: PT  Co-Treatment Reason: For increased safety with mobility  Prior to Session Communication: Bedside nurse  Patient Position Received: Bed, 3 rail up, Alarm on  General Comment: Pt agreeable to therapy. Pt son present for tx session.  Precautions:  Hearing/Visual Limitations: [place]  Medical Precautions: Fall precautions  Precautions Comment: R nephrostomy drain in place           Pain Assessment:  Pain Assessment  Pain Assessment: 0-10  0-10 (Numeric) Pain Score: 0 - No pain    Objective   Cognition:  Orientation Level: Disoriented to time    Bed Mobility/Transfers: Bed Mobility 1  Bed  Mobility 1: Supine to sitting  Level of Assistance 1: Moderate assistance, +2  Bed Mobility 2  Bed Mobility  2: Scooting  Level of Assistance 2: Moderate assistance    Transfer 1  Technique 1: Sit to stand, Stand to sit  Transfer Device 1:  (arm in arm assist)  Transfer Level of Assistance 1: Moderate assistance, +2  Transfers 2  Transfer From 2: Bed to  Transfer to 2: Chair with arms  Technique 2: Sit to stand, Stand to sit  Transfer Device 2:  (arm in arm)  Transfer Level of Assistance 2: Moderate assistance, +2        Functional Mobility:  Functional Mobility 1  Comments 1: Pt participated in functional mobility < min household distance x2 trials using FWW with mod A x2 using arm in arm assist.       Other Activity:  Other Activity  Other Activity Performed:  (Pt sitting in bedside chair following tx. Alarm on and call light in reach.)        Outcome Measures:  [facility] Daily Activity  Putting on and taking off regular lower body clothing: A lot  Bathing (including washing, rinsing, drying): A lot  Putting on and taking off regular upper body clothing: A lot  Toileting, which includes using toilet, bedpan or urinal: A lot  Taking care of personal grooming such as brushing teeth: A little  Eating Meals: A little  Daily Activity - Total Score: 14        Education Documentation  Body Mechanics, taught by ESE Galeana at 7/17/2025  2:08 PM.  Learner: Patient  Readiness: Acceptance  Method: Explanation  Response: Needs Reinforcement  Comment: functional transfer training    Education Comments  No comments found.      Goals:  Encounter Problems       Encounter Problems (Active)       BALANCE       Pt will maintain dynamic sitting balance during ADL task with stand by assist level of assistance in order to demonstrate decreased risk of falling and improved postural control. (Progressing)       Start:  07/15/25    Expected End:  07/29/25               MOBILITY       Patient will perform Functional mobility   Household distances/Community Distances with minimal assist  level of assistance and least restrictive device in order to improve safety and functional mobility. (Progressing)       Start:  07/15/25    Expected End:  07/29/25               TRANSFERS       Patient will complete functional transfers with least restrictive device with minimal assist  level of assistance. (Progressing)       Start:  07/15/25    Expected End:  07/29/25

## 2025-07-17 NOTE — PROGRESS NOTES
Pharmacy - Antimicrobial dosing adjustment     Per System Dosing Policy     Dose adjustment from cefazolin 2g q12 to 2g q8; CrCl 35.5; indication bacteremia    Chasity Alfonso, PharmD

## 2025-07-17 NOTE — DOCUMENTATION CLARIFICATION NOTE
"    PATIENT:               ALEXI SARGENT  ACCT #:                  [id number]  MRN:                       [id number]  :                       [id number]  ADMIT DATE:       2025 2:05 PM  DISCH DATE:  RESPONDING PROVIDER #:        [id number]          PROVIDER RESPONSE TEXT:    NSTEMI    CDI QUERY TEXT:    Clarification    Instruction:    Based on your assessment of the patient and the clinical information, please provide the requested documentation by clicking on the appropriate radio button and enter any additional information if prompted.    Question: Is there a diagnosis indicative of the patient elevated Troponins and symptoms    When answering this query, please exercise your independent professional judgment. The fact that a question is being asked, does not imply that any particular answer is desired or expected.    The patient's clinical indicators include:  Clinical Information: There is conflicting documentation in the medical record which requires clarification.    Per H and P, \"NSTEMI.\"    Per cardiac consult, \"elevated troponin-nonischemic myocardial injury in a patient with septic shock.\"    Per ID consult, \"Elevated troponin, likely type II.\"    Per  cardiac PN, \"ECG shows wandering atrial rhythm without ST-T changes....  Troponin elevated was thought to be type II MI and we recommended medical tx and signed off.\"    Clinical Indicators: Per ED, \"EKG on my interpretation shows a normal sinus rhythm normal axis rate in the mid 80s with PACs with no acute ischemic changes.\"    25 14:44  Troponin I, High Sensitivity: 45  25 16:59  Troponin I, High Sensitivity: 636  25 19:48  Troponin I, High Sensitivity: 587    Treatment: trending troponins, EKG, cardiac consult    Risk Factors: 89yof admitted with sepsis with noted elevated troponins  Options provided:  -- NSTEMI  -- Type II MI  -- Acute Myocardial Injury  -- Other - I will add my own diagnosis  -- Refer to Clinical Documentation " Reviewer    Query created by: Mely Tran on 7/17/2025 11:20 AM      Electronically signed by:  NISHA TRAN MD PhD 7/17/2025 12:22 PM

## 2025-07-17 NOTE — PROGRESS NOTES
Physical Therapy  Physical Therapy Treatment    Patient Name: Delia Mckeon  MRN: [id number]  Department: [address]  Room: 2021/2021-A  Today's Date: 7/17/2025  Time Calculation  Start Time: 1131  Stop Time: 1154  Time Calculation (min): 23 min    PT Plan  Treatment/Interventions: Bed mobility, Transfer training, Gait training, Stair training, Balance training, Neuromuscular re-education, Strengthening, Endurance training, Therapeutic exercise, Therapeutic activity, Home exercise program  PT Plan: Ongoing PT  PT Frequency: 4 times per week  PT Discharge Recommendations: Moderate intensity level of continued care (pending patient progress.)Based on current functional status and rehab potential, patient is anticipated to tolerate and benefit from 5 or more days per week of skilled rehabilitative therapy after discharge from this acute inpatient .  Equipment Recommended upon Discharge:  (TBD)  PT Recommended Transfer Status: Assist x2  PT - OK to Discharge: Yes (When medically cleared)    PT Visit Info:  PT Received On: 07/17/25  Response to Previous Treatment: Patient with no complaints from previous session.     General Visit Information:   General  Reason for Referral: Impaired mobility    Precautions:  Precautions  Precautions Comment: R nephrostomy drain in place    Pain:  Pain Assessment  0-10 (Numeric) Pain Score: 0 - No pain    Cognition:  Cognition  Orientation Level: Disoriented to time    Activity Tolerance:  Activity Tolerance  Endurance: Tolerates 10 - 20 min exercise with multiple rests    Treatments:  Bed Mobility  Bed Mobility: Yes  Bed Mobility 1  Bed Mobility 1: Supine to sitting  Level of Assistance 1: Moderate assistance, +2  Bed Mobility 2  Bed Mobility  2: Scooting  Level of Assistance 2: Moderate assistance    Transfers  Transfer: Yes  Transfer 1  Technique 1: Sit to stand  Transfer Level of Assistance 1: Moderate assistance, +2  Transfers 2  Technique 2: Stand to sit  Transfer Level of Assistance  2: Moderate assistance, +2    Ambulation/Gait Training  Ambulation/Gait Training Performed: Yes  Ambulation/Gait Training 1  Comments/Distance (ft) 1: 6 feet x2 reps with chair follow, modA+2          Pt sitting in bedside chair following tx. Alarm on and call light in reach.        Outcome Measures:  [facility] Basic Mobility  Turning from your back to your side while in a flat bed without using bedrails: A lot  Moving from lying on your back to sitting on the side of a flat bed without using bedrails: A lot  Moving to and from bed to chair (including a wheelchair): A lot  Standing up from a chair using your arms (e.g. wheelchair or bedside chair): A lot  To walk in hospital room: A lot  Climbing 3-5 steps with railing: Total  Basic Mobility - Total Score: 11    Education Documentation  Mobility Training, taught by Ayden Maradiaga PTA at 7/17/2025  1:20 PM.  Learner: Patient  Readiness: Acceptance  Method: Explanation, Demonstration  Response: Verbalizes Understanding, Needs Reinforcement    Education Comments  No comments found.        OP EDUCATION:       Encounter Problems       Encounter Problems (Active)       PT Problem       Transfers (Progressing)       Start:  07/15/25    Expected End:  07/29/25       Patient will perform all transfers with min A x 1 for increased safety           Gait (Progressing)       Start:  07/15/25    Expected End:  07/29/25       Patient will amb 40+ feet with min A x 1           Strengthening (Progressing)       Start:  07/15/25    Expected End:  07/29/25       Patient will perform 20+ reps of AROM for LINDA LE's to improve safety and functional independence

## 2025-07-17 NOTE — CARE PLAN
Problem: Safety - Adult  Goal: Free from fall injury  Outcome: Progressing     Problem: Safety - Adult  Goal: Free from fall injury  Outcome: Progressing   The patient's goals for the shift include      The clinical goals for the shift include remain stable and without falls

## 2025-07-17 NOTE — DOCUMENTATION CLARIFICATION NOTE
"    PATIENT:               ALEXI SARGENT  ACCT #:                  [id number]  MRN:                       [id number]  :                       1918  ADMIT DATE:       2025 2:05 PM  DISCH DATE:  RESPONDING PROVIDER #:        [id number]          PROVIDER RESPONSE TEXT:    I agree with dietician diagnosis of Severe Malnutrition on 25    CDI QUERY TEXT:    Clarification    Instruction:    Based on your assessment of the patient and the clinical information, please provide the requested documentation by clicking on the appropriate radio button and enter any additional information if prompted.    Question: Please further clarify this patient nutritional status as    When answering this query, please exercise your independent professional judgment. The fact that a question is being asked, does not imply that any particular answer is desired or expected.    The patient's clinical indicators include:  Clinical Information: Patient admitted with sepsis, hyponatremia, and hypomagnesemia    Clinical Indicators: Per  nutrition consult, \"Patient has Malnutrition Diagnosis: Yes  Diagnosis Status: New  Malnutrition Diagnosis: Severe malnutrition related to acute disease or injury  Related to: Sepsis secondary to UTI, Proteus mirabilis bacteremia, elevated WBC  As Evidenced by: <50% EER for >5 day, moderate fat losses (orbital, buccal), moderate muscle wasting (temporalis).\"    Treatment: nutrition consult, magic cup BID    Risk Factors: 89yof admitted with sepsis, hyponatremia, and hypomagnesemia  Options provided:  -- I agree with dietician diagnosis of Severe Malnutrition on 25  -- Other - I will add my own diagnosis  -- Refer to Clinical Documentation Reviewer    Query created by: Mely Tran on 2025 10:22 AM      Electronically signed by:  NISHA TRAN MD PhD 2025 10:36 AM          "

## 2025-07-17 NOTE — CARE PLAN
The patient's goals for the shift include      The clinical goals for the shift include increase oral intake      Problem: Pain - Adult  Goal: Verbalizes/displays adequate comfort level or baseline comfort level  Outcome: Progressing     Problem: Safety - Adult  Goal: Free from fall injury  Outcome: Progressing     Problem: Discharge Planning  Goal: Discharge to home or other facility with appropriate resources  Outcome: Progressing     Problem: Chronic Conditions and Co-morbidities  Goal: Patient's chronic conditions and co-morbidity symptoms are monitored and maintained or improved  Outcome: Progressing     Problem: Nutrition  Goal: Nutrient intake appropriate for maintaining nutritional needs  Outcome: Progressing     Problem: Skin  Goal: Decreased wound size/increased tissue granulation at next dressing change  Outcome: Progressing  Goal: Participates in plan/prevention/treatment measures  Outcome: Progressing  Goal: Prevent/manage excess moisture  Outcome: Progressing  Goal: Prevent/minimize sheer/friction injuries  Outcome: Progressing  Goal: Promote/optimize nutrition  Outcome: Progressing  Goal: Promote skin healing  Outcome: Progressing     Problem: Pain  Goal: Takes deep breaths with improved pain control throughout the shift  Outcome: Progressing  Goal: Turns in bed with improved pain control throughout the shift  Outcome: Progressing  Goal: Walks with improved pain control throughout the shift  Outcome: Progressing  Goal: Performs ADL's with improved pain control throughout shift  Outcome: Progressing  Goal: Participates in PT with improved pain control throughout the shift  Outcome: Progressing  Goal: Free from opioid side effects throughout the shift  Outcome: Progressing  Goal: Free from acute confusion related to pain meds throughout the shift  Outcome: Progressing     Problem: Fall/Injury  Goal: Not fall by end of shift  Outcome: Progressing  Goal: Be free from injury by end of the shift  Outcome:  Progressing  Goal: Verbalize understanding of personal risk factors for fall in the hospital  Outcome: Progressing  Goal: Verbalize understanding of risk factor reduction measures to prevent injury from fall in the home  Outcome: Progressing  Goal: Use assistive devices by end of the shift  Outcome: Progressing  Goal: Pace activities to prevent fatigue by end of the shift  Outcome: Progressing

## 2025-07-18 ENCOUNTER — PHARMACY VISIT (OUTPATIENT)
Dept: PHARMACY | Facility: CLINIC | Age: 89
End: 2025-07-18
Payer: COMMERCIAL

## 2025-07-18 VITALS
HEIGHT: 61 IN | TEMPERATURE: 97.4 F | HEART RATE: 100 BPM | RESPIRATION RATE: 18 BRPM | OXYGEN SATURATION: 93 % | DIASTOLIC BLOOD PRESSURE: 97 MMHG | BODY MASS INDEX: 30.09 KG/M2 | SYSTOLIC BLOOD PRESSURE: 168 MMHG | WEIGHT: 159.39 LBS

## 2025-07-18 PROBLEM — A41.9 SEPSIS SECONDARY TO UTI (MULTI): Status: RESOLVED | Noted: 2025-07-14 | Resolved: 2025-07-18

## 2025-07-18 PROBLEM — N39.0 SEPSIS SECONDARY TO UTI (MULTI): Status: RESOLVED | Noted: 2025-07-14 | Resolved: 2025-07-18

## 2025-07-18 LAB
ANION GAP SERPL CALC-SCNC: 12 MMOL/L (ref 10–20)
BUN SERPL-MCNC: 15 MG/DL (ref 6–23)
CALCIUM SERPL-MCNC: 10 MG/DL (ref 8.6–10.3)
CHLORIDE SERPL-SCNC: 101 MMOL/L (ref 98–107)
CO2 SERPL-SCNC: 25 MMOL/L (ref 21–32)
CREAT SERPL-MCNC: 0.71 MG/DL (ref 0.5–1.05)
EGFRCR SERPLBLD CKD-EPI 2021: 74 ML/MIN/1.73M*2
GLUCOSE SERPL-MCNC: 137 MG/DL (ref 74–99)
MAGNESIUM SERPL-MCNC: 1.79 MG/DL (ref 1.6–2.4)
POTASSIUM SERPL-SCNC: 4.8 MMOL/L (ref 3.5–5.3)
SODIUM SERPL-SCNC: 133 MMOL/L (ref 136–145)

## 2025-07-18 PROCEDURE — RXMED WILLOW AMBULATORY MEDICATION CHARGE

## 2025-07-18 PROCEDURE — 99239 HOSP IP/OBS DSCHRG MGMT >30: CPT | Performed by: INTERNAL MEDICINE

## 2025-07-18 PROCEDURE — 2500000004 HC RX 250 GENERAL PHARMACY W/ HCPCS (ALT 636 FOR OP/ED)

## 2025-07-18 PROCEDURE — 83735 ASSAY OF MAGNESIUM: CPT | Performed by: INTERNAL MEDICINE

## 2025-07-18 PROCEDURE — 80048 BASIC METABOLIC PNL TOTAL CA: CPT | Performed by: INTERNAL MEDICINE

## 2025-07-18 PROCEDURE — 36415 COLL VENOUS BLD VENIPUNCTURE: CPT | Performed by: INTERNAL MEDICINE

## 2025-07-18 RX ORDER — LISINOPRIL 10 MG/1
10 TABLET ORAL DAILY
Qty: 30 TABLET | Refills: 11 | Status: SHIPPED | OUTPATIENT
Start: 2025-07-18 | End: 2026-07-18

## 2025-07-18 RX ORDER — AMOXICILLIN AND CLAVULANATE POTASSIUM 875; 125 MG/1; MG/1
1 TABLET, FILM COATED ORAL 2 TIMES DAILY
Qty: 6 TABLET | Refills: 0 | Status: SHIPPED | OUTPATIENT
Start: 2025-07-18 | End: 2025-07-21

## 2025-07-18 RX ADMIN — CEFAZOLIN SODIUM 2 G: 2 SOLUTION INTRAVENOUS at 01:05

## 2025-07-18 RX ADMIN — CEFAZOLIN SODIUM 2 G: 2 SOLUTION INTRAVENOUS at 08:29

## 2025-07-18 RX ADMIN — ENOXAPARIN SODIUM 30 MG: 30 INJECTION SUBCUTANEOUS at 08:29

## 2025-07-18 ASSESSMENT — COGNITIVE AND FUNCTIONAL STATUS - GENERAL
MOVING TO AND FROM BED TO CHAIR: A LOT
TOILETING: A LOT
DAILY ACTIVITIY SCORE: 15
HELP NEEDED FOR BATHING: A LOT
MOVING FROM LYING ON BACK TO SITTING ON SIDE OF FLAT BED WITH BEDRAILS: A LITTLE
EATING MEALS: A LITTLE
DRESSING REGULAR UPPER BODY CLOTHING: A LITTLE
TURNING FROM BACK TO SIDE WHILE IN FLAT BAD: A LITTLE
WALKING IN HOSPITAL ROOM: A LOT
DRESSING REGULAR LOWER BODY CLOTHING: A LOT
MOBILITY SCORE: 13
CLIMB 3 TO 5 STEPS WITH RAILING: TOTAL
PERSONAL GROOMING: A LITTLE
STANDING UP FROM CHAIR USING ARMS: A LOT

## 2025-07-18 ASSESSMENT — PAIN SCALES - GENERAL: PAINLEVEL_OUTOF10: 0 - NO PAIN

## 2025-07-18 NOTE — PROGRESS NOTES
Delia Mckeon is a 89 y.o. female on day 4 of admission presenting with Sepsis secondary to UTI (Multi).      Assessment/Plan:     #Proteus mirabilis bacteremia  #Right hydronephrosis s/p nephrostomy tube 7/14/2025  #Sepsis , poa, resolved  Zosyn switched to ceftriaxone and now on cefazolin.  Can DC on Augmentin     #Elevated troponin, likely type II  Likely due to sepsis.  Cardiology consulted and recommended no further management     HTN     Recommendations:     -Cont Ancef 2 g every 8 hour  -Can switch to Augmentin 875 3 times daily through 07/21  -No indication to repeat blood cultures.  Clinically improving  -Patient can be discharged from ID standpoint      Pawan Bo MD  Date of service: 7/18/2025  Time of service: 9:16 AM      Subjective   Interval History: No acute events overnight.  Patient denies any new complaint.        Review of Systems  Denies: fever, chills, nausea, vomiting, diarrhea, dysuria    Objective   Range of Vitals (last 24 hours)  Heart Rate:  []   Temp:  [36.3 °C (97.4 °F)-36.8 °C (98.3 °F)]   Resp:  [14-18]   BP: (149-175)/(75-97)   SpO2:  [91 %-95 %]  Daily Weight  07/17/25 : 72.3 kg (159 lb 6.3 oz)   Body mass index is 30.13 kg/m².    General: sleeping but easily arousable  HEENT: No conjunctival pallor, no scleral icterus  Lungs: bilaterally clear to auscultation, no wheezing  Abdomen: soft, non tender, R nephrostomy tube  Neuro: AAO x 3, follow commands  Skin: No rashes or ulcers  MSK: No joint inflammation         Antibiotics  amoxicillin-clavulanate - 875-125 mg  ceFAZolin - 2 gram/50 mL      Relevant Results  Labs  Results from last 72 hours   Lab Units 07/17/25  0422 07/16/25  0410   WBC AUTO x10*3/uL 11.5* 13.4*   HEMOGLOBIN g/dL 10.3* 10.8*   HEMATOCRIT % 29.8* 30.9*   PLATELETS AUTO x10*3/uL 395 397     Results from last 72 hours   Lab Units 07/18/25  0339 07/17/25  0422 07/16/25  0410   SODIUM mmol/L 133* 135* 132*   POTASSIUM mmol/L 4.8 3.5 3.6   CHLORIDE mmol/L 101  "100 100   CO2 mmol/L 25 26 27   BUN mg/dL 15 15 20   CREATININE mg/dL 0.71 0.67 0.81   GLUCOSE mg/dL 137* 115* 130*   CALCIUM mg/dL 10.0 9.8 9.9   ANION GAP mmol/L 12 13 9*   EGFR mL/min/1.73m*2 74 76 63           Estimated Creatinine Clearance: 33.5 mL/min (by C-G formula based on SCr of 0.71 mg/dL).  No results found for: \"CRP\"    Microbiology  Susceptibility data from last 14 days.  Collected Specimen Info Organism Amikacin Ampicillin Cefazolin Ciprofloxacin Gentamicin Levofloxacin Piperacillin/Tazobactam Tetracycline Trimethoprim/Sulfamethoxazole   07/13/25 Blood culture from Peripheral Venipuncture Proteus mirabilis            07/13/25 Blood culture from Peripheral Venipuncture Proteus mirabilis  S  S  S  S  S  S  S  R  S       Imaging    IR placement of nephrostomy catheter  Result Date: 7/14/2025  1.  Uncomplicated and technically successful  8-Cam[place]onian percutaneous nephrostomy tube placement -  right kidney.  The catheter was connected to external gravity drainage.   Performed and dictated at [facility].   MACRO: None   Signed by: Lavell Larry 7/14/2025 3:04 PM Dictation workstation:   [id number]    CT abdomen pelvis w IV contrast  Result Date: 7/13/2025  1. Obstructing calculi in the distal right ureter resulting in moderate hydronephrosis and a focal perforation the posterior wall of the mid ureter evidenced by increase in quantity of hyperdense fluid and extraluminal contrast adjacent to the may ureter as described below and above.   1 cm x 0.7 cm calculus obstructing the distal right ureter and several small calculi slightly distal to this. There is moderate hydroureteronephrosis and delayed nephrogram. There is diffuse thickening of the ureteral wall with hyperenhancement. There is loss of definition of the posterior wall of the right mid ureter adjacent to significant amount of periureteral fluid (axial image 76, series 2). On multiple subsequent delayed images there " is increased quantity of fluid surrounding this portion of the ureter that is of high density and layering contrast can be seen on the 1st delayed phase image obtained beyond the wall of the ureter (axial image 80, series 7) consistent with a focal perforation in the posterior wall.   2. Cholelithiasis without evidence of acute cholecystitis.     MACRO: Nicolas Swift discussed the significance and urgency of this critical finding by EPIC secure chat with Dr. Germain on 7/13/2025 at 6:01 pm.  (**-RCF-**) Findings:  See findings.   Signed by: Nicolas Swift 7/13/2025 6:02 PM Dictation workstation:   [id number]    XR chest 1 view  Result Date: 7/13/2025  1. Cardiomegaly with perihilar vascular congestion and small bilateral pleural effusion. Findings could be related to   Signed by: Gilberto Eng 7/13/2025 4:21 PM Dictation workstation:   [id number]

## 2025-07-18 NOTE — CARE PLAN
The patient's goals for the shift include get rest and stay informed.    The clinical goals for the shift include Pt will remain free of injury throughout the shift.    Over the shift, the patient did make progress toward the following goals. Barriers to progression include understanding. Recommendations to address these barriers include education.

## 2025-07-18 NOTE — CARE PLAN
Problem: Skin  Goal: Decreased wound size/increased tissue granulation at next dressing change  Outcome: Progressing  Goal: Participates in plan/prevention/treatment measures  Outcome: Progressing  Flowsheets (Taken 7/17/2025 2156)  Participates in plan/prevention/treatment measures:   Discuss with provider PT/OT consult   Elevate heels   Increase activity/out of bed for meals  Goal: Prevent/manage excess moisture  Outcome: Progressing  Flowsheets (Taken 7/17/2025 2156)  Prevent/manage excess moisture:   Cleanse incontinence/protect with barrier cream   Moisturize dry skin   Monitor for/manage infection if present  Goal: Prevent/minimize sheer/friction injuries  Outcome: Progressing  Flowsheets (Taken 7/17/2025 2156)  Prevent/minimize sheer/friction injuries:   Turn/reposition every 2 hours/use positioning/transfer devices   HOB 30 degrees or less   Use pull sheet   Increase activity/out of bed for meals  Goal: Promote/optimize nutrition  Outcome: Progressing  Flowsheets (Taken 7/17/2025 2156)  Promote/optimize nutrition:   Assist with feeding   Monitor/record intake including meals   Offer water/supplements/favorite foods  Goal: Promote skin healing  Outcome: Progressing     Problem: Nutrition  Goal: Nutrient intake appropriate for maintaining nutritional needs  Outcome: Progressing   The patient's goals for the shift include      The clinical goals for the shift include Pt will remain HDS, free from falls and injury throughout this shift.    Over the shift, the patient did not make progress toward the following goals. Barriers to progression include n/a. Recommendations to address these barriers include n/a.

## 2025-07-18 NOTE — DISCHARGE SUMMARY
DISCHARGE SUMMARY     Discharge Diagnosis  Sepsis secondary to UTI (Multi)    This discharge took greater than 35 minutes.    Test Results Pending At Discharge  Pending Labs       No current pending labs.            Hospital Course   Delia Mckeon is a 89 y.o. female with PMHx of HTN who presented with nausea and vomiting. She was treated with antibiotics a week ago for a UTI but over the last few days PTA she had been vomiting. At the bedside she is deeply asleep and son and  are at the bedside. Remainder of ROS reviewed and negative except as indicated in HPI.      7/16: She was tired appearing today. Son states she is near her baseline.      7/17: She remains tired appearing, which, per her son, is her baseline.     Obstructive right distal ureter stone with associated hydroureteronephrosis   Proteus bacteremia  Abx narrowed to Ancef based on sensitivities   Augmentin through 07/21   Pt is s/p percutaneous nephrostomy placement, no ureteral wall perforation was seen  Urology referral on DC     NSTEMI  Cardiology recommending no further intervention, echocardiogram with HFpEF     Atrial Fibrillation   appears to be a new dx   Based on patients advanced age cardiology does not recommend any rate control or stroke ppx.      Anorexia  consult dietician     Mild hyponatremia  Stop hydrochlorothiazide as below      Hypertension/Hypotension, resolved with IVF   -Regarding home meds: I have discontinued the benazepril-hydrochlorothiazide combination due to hyponatremia and replaced the benazepril component with lisinopril.     Pertinent Physical Exam At Time of Discharge  Constitutional: Well developed, sleepy, oriented x4, no acute distress, cooperative   Eyes: EOMI, clear sclerae   ENMT: mucous membranes moist, no lesions seen   Head/Neck: Neck supple, no apparent injury, head atraumatic   Respiratory/Thorax: CTAB anteriorly, good chest expansion, respirations even and unlabored, pt on 2L O2   Cardiovascular:  Regular rate and rhythm, no murmurs/rubs/gallops, normal S1 and S 2   Gastrointestinal: Abdomen nondistended, soft, nontender, +BS, no bruits   Musculoskeletal: ROM intact, no joint swelling, normal  strength   Extremities: no cyanosis, contusions or clubbing, or edema   Neurological: no focal deficit, pt alert and oriented x4   Psychological: Appropriate affect and behavior, pleasant   Skin: Warm and dry, no lesions, no rashes       Home Medications     Medication List      START taking these medications     amoxicillin-clavulanate 875-125 mg tablet; Commonly known as: Augmentin;   Take 1 tablet by mouth 2 times a day for 3 days.   lisinopril 10 mg tablet; Take 1 tablet (10 mg) by mouth once daily.     CONTINUE taking these medications     propranolol 80 mg tablet; Commonly known as: Inderal   verapamil  mg ER tablet; Commonly known as: Calan-SR     STOP taking these medications     benazepriL-hydrochlorothiazide 20-12.5 mg tablet; Commonly known as:   Lotensin HCT   nitrofurantoin 100 mg capsule; Commonly known as: Macrodantin       Outpatient Follow-Up  No follow-ups on file.     Min Mosquera MD PhD  7/18/2025  8:57 AM

## 2025-08-07 LAB
ATRIAL RATE: 99 BPM
P AXIS: 73 DEGREES
PR INTERVAL: 207 MS
Q ONSET: 253 MS
QRS COUNT: 15 BEATS
QRS DURATION: 88 MS
QT INTERVAL: 346 MS
QTC CALCULATION(BAZETT): 440 MS
QTC FREDERICIA: 405 MS
R AXIS: -23 DEGREES
T AXIS: 116 DEGREES
T OFFSET: 426 MS
VENTRICULAR RATE: 97 BPM

## 2025-08-18 ENCOUNTER — APPOINTMENT (OUTPATIENT)
Dept: UROLOGY | Facility: CLINIC | Age: 89
End: 2025-08-18
Payer: MEDICARE